# Patient Record
Sex: FEMALE | Race: WHITE | NOT HISPANIC OR LATINO | Employment: OTHER | URBAN - METROPOLITAN AREA
[De-identification: names, ages, dates, MRNs, and addresses within clinical notes are randomized per-mention and may not be internally consistent; named-entity substitution may affect disease eponyms.]

---

## 2019-11-14 ENCOUNTER — OFFICE VISIT (OUTPATIENT)
Dept: FAMILY MEDICINE | Facility: CLINIC | Age: 84
End: 2019-11-14
Payer: MEDICARE

## 2019-11-14 ENCOUNTER — LAB VISIT (OUTPATIENT)
Dept: LAB | Facility: HOSPITAL | Age: 84
End: 2019-11-14
Attending: INTERNAL MEDICINE
Payer: MEDICARE

## 2019-11-14 VITALS
HEART RATE: 66 BPM | DIASTOLIC BLOOD PRESSURE: 73 MMHG | HEIGHT: 64 IN | BODY MASS INDEX: 42.85 KG/M2 | WEIGHT: 251 LBS | SYSTOLIC BLOOD PRESSURE: 131 MMHG | TEMPERATURE: 98 F

## 2019-11-14 DIAGNOSIS — M15.9 GENERALIZED OSTEOARTHRITIS: ICD-10-CM

## 2019-11-14 DIAGNOSIS — Z79.899 ENCOUNTER FOR LONG-TERM CURRENT USE OF MEDICATION: ICD-10-CM

## 2019-11-14 DIAGNOSIS — Z86.711 HX PULMONARY EMBOLISM: ICD-10-CM

## 2019-11-14 DIAGNOSIS — E78.2 MIXED HYPERLIPIDEMIA: ICD-10-CM

## 2019-11-14 DIAGNOSIS — E03.8 OTHER SPECIFIED HYPOTHYROIDISM: ICD-10-CM

## 2019-11-14 DIAGNOSIS — M80.00XD AGE-RELATED OSTEOPOROSIS WITH CURRENT PATHOLOGICAL FRACTURE WITH ROUTINE HEALING: ICD-10-CM

## 2019-11-14 DIAGNOSIS — E55.9 VITAMIN D DEFICIENCY: ICD-10-CM

## 2019-11-14 DIAGNOSIS — I10 HYPERTENSION, UNSPECIFIED TYPE: ICD-10-CM

## 2019-11-14 DIAGNOSIS — Z71.89 ENCOUNTER FOR MEDICATION COUNSELING: ICD-10-CM

## 2019-11-14 DIAGNOSIS — M17.0 PRIMARY OSTEOARTHRITIS OF BOTH KNEES: ICD-10-CM

## 2019-11-14 DIAGNOSIS — J45.20 MILD INTERMITTENT REACTIVE AIRWAY DISEASE WITHOUT COMPLICATION: Chronic | ICD-10-CM

## 2019-11-14 DIAGNOSIS — E66.01 MORBID OBESITY WITH BMI OF 40.0-44.9, ADULT: ICD-10-CM

## 2019-11-14 DIAGNOSIS — R60.0 BILATERAL EDEMA OF LOWER EXTREMITY: ICD-10-CM

## 2019-11-14 DIAGNOSIS — R73.01 IMPAIRED FASTING GLUCOSE: ICD-10-CM

## 2019-11-14 DIAGNOSIS — E78.5 HYPERLIPIDEMIA, UNSPECIFIED HYPERLIPIDEMIA TYPE: ICD-10-CM

## 2019-11-14 DIAGNOSIS — Z79.01 LONG TERM CURRENT USE OF ANTICOAGULANT THERAPY: ICD-10-CM

## 2019-11-14 DIAGNOSIS — R79.89 ABNORMAL CBC: ICD-10-CM

## 2019-11-14 DIAGNOSIS — K21.9 GASTROESOPHAGEAL REFLUX DISEASE, ESOPHAGITIS PRESENCE NOT SPECIFIED: ICD-10-CM

## 2019-11-14 DIAGNOSIS — M81.0 OSTEOPOROSIS, UNSPECIFIED OSTEOPOROSIS TYPE, UNSPECIFIED PATHOLOGICAL FRACTURE PRESENCE: ICD-10-CM

## 2019-11-14 DIAGNOSIS — S82.142D CLOSED FRACTURE OF LEFT TIBIAL PLATEAU WITH ROUTINE HEALING, SUBSEQUENT ENCOUNTER: ICD-10-CM

## 2019-11-14 DIAGNOSIS — I10 ESSENTIAL HYPERTENSION: Primary | ICD-10-CM

## 2019-11-14 DIAGNOSIS — H35.30 MACULAR DEGENERATION, UNSPECIFIED LATERALITY, UNSPECIFIED TYPE: ICD-10-CM

## 2019-11-14 DIAGNOSIS — I48.0 PAROXYSMAL ATRIAL FIBRILLATION: Chronic | ICD-10-CM

## 2019-11-14 PROBLEM — S82.142A CLOSED FRACTURE OF LEFT TIBIAL PLATEAU: Status: ACTIVE | Noted: 2019-06-20

## 2019-11-14 PROBLEM — R29.6 MULTIPLE FALLS: Status: ACTIVE | Noted: 2018-01-31

## 2019-11-14 PROBLEM — R35.0 INCREASED FREQUENCY OF URINATION: Status: ACTIVE | Noted: 2018-10-15

## 2019-11-14 PROBLEM — R15.9 FECAL INCONTINENCE: Status: ACTIVE | Noted: 2017-01-06

## 2019-11-14 PROBLEM — J30.9 CHRONIC ALLERGIC RHINITIS: Status: ACTIVE | Noted: 2017-02-16

## 2019-11-14 PROBLEM — G47.33 OBSTRUCTIVE SLEEP APNEA OF ADULT: Status: ACTIVE | Noted: 2019-04-15

## 2019-11-14 PROBLEM — J45.909 REACTIVE AIRWAY DISEASE: Chronic | Status: ACTIVE | Noted: 2017-02-16

## 2019-11-14 PROBLEM — J45.909 REACTIVE AIRWAY DISEASE: Status: ACTIVE | Noted: 2017-02-16

## 2019-11-14 PROBLEM — J30.9 CHRONIC ALLERGIC RHINITIS: Chronic | Status: ACTIVE | Noted: 2017-02-16

## 2019-11-14 PROBLEM — G47.33 OBSTRUCTIVE SLEEP APNEA OF ADULT: Chronic | Status: ACTIVE | Noted: 2019-04-15

## 2019-11-14 PROBLEM — Z87.898 HISTORY OF WHEEZING: Status: ACTIVE | Noted: 2017-01-16

## 2019-11-14 PROBLEM — R06.09 DYSPNEA ON EXERTION: Status: ACTIVE | Noted: 2017-01-06

## 2019-11-14 LAB
ALBUMIN SERPL BCP-MCNC: 3.8 G/DL (ref 3.5–5.2)
ALP SERPL-CCNC: 64 U/L (ref 55–135)
ALT SERPL W/O P-5'-P-CCNC: 11 U/L (ref 10–44)
ANION GAP SERPL CALC-SCNC: 10 MMOL/L (ref 8–16)
AST SERPL-CCNC: 20 U/L (ref 10–40)
BASOPHILS # BLD AUTO: 0.03 K/UL (ref 0–0.2)
BASOPHILS NFR BLD: 0.5 % (ref 0–1.9)
BILIRUB SERPL-MCNC: 0.9 MG/DL (ref 0.1–1)
BUN SERPL-MCNC: 16 MG/DL (ref 8–23)
CALCIUM SERPL-MCNC: 9.3 MG/DL (ref 8.7–10.5)
CHLORIDE SERPL-SCNC: 102 MMOL/L (ref 95–110)
CO2 SERPL-SCNC: 30 MMOL/L (ref 23–29)
CREAT SERPL-MCNC: 0.8 MG/DL (ref 0.5–1.4)
DIFFERENTIAL METHOD: ABNORMAL
EOSINOPHIL # BLD AUTO: 0.1 K/UL (ref 0–0.5)
EOSINOPHIL NFR BLD: 1.2 % (ref 0–8)
ERYTHROCYTE [DISTWIDTH] IN BLOOD BY AUTOMATED COUNT: 13.7 % (ref 11.5–14.5)
EST. GFR  (AFRICAN AMERICAN): >60 ML/MIN/1.73 M^2
EST. GFR  (NON AFRICAN AMERICAN): >60 ML/MIN/1.73 M^2
FERRITIN SERPL-MCNC: 37 NG/ML (ref 20–300)
GLUCOSE SERPL-MCNC: 95 MG/DL (ref 70–110)
HCT VFR BLD AUTO: 39.1 % (ref 37–48.5)
HGB BLD-MCNC: 12.1 G/DL (ref 12–16)
IMM GRANULOCYTES # BLD AUTO: 0.01 K/UL (ref 0–0.04)
IMM GRANULOCYTES NFR BLD AUTO: 0.2 % (ref 0–0.5)
IRON SERPL-MCNC: 67 UG/DL (ref 30–160)
LYMPHOCYTES # BLD AUTO: 1.7 K/UL (ref 1–4.8)
LYMPHOCYTES NFR BLD: 26.7 % (ref 18–48)
MAGNESIUM SERPL-MCNC: 1.9 MG/DL (ref 1.6–2.6)
MCH RBC QN AUTO: 30 PG (ref 27–31)
MCHC RBC AUTO-ENTMCNC: 30.9 G/DL (ref 32–36)
MCV RBC AUTO: 97 FL (ref 82–98)
MONOCYTES # BLD AUTO: 0.6 K/UL (ref 0.3–1)
MONOCYTES NFR BLD: 8.6 % (ref 4–15)
NEUTROPHILS # BLD AUTO: 4.1 K/UL (ref 1.8–7.7)
NEUTROPHILS NFR BLD: 62.8 % (ref 38–73)
NRBC BLD-RTO: 0 /100 WBC
PLATELET # BLD AUTO: 145 K/UL (ref 150–350)
PMV BLD AUTO: 12.6 FL (ref 9.2–12.9)
POTASSIUM SERPL-SCNC: 4 MMOL/L (ref 3.5–5.1)
PROT SERPL-MCNC: 6.9 G/DL (ref 6–8.4)
RBC # BLD AUTO: 4.03 M/UL (ref 4–5.4)
SATURATED IRON: 16 % (ref 20–50)
SODIUM SERPL-SCNC: 142 MMOL/L (ref 136–145)
TOTAL IRON BINDING CAPACITY: 423 UG/DL (ref 250–450)
TRANSFERRIN SERPL-MCNC: 286 MG/DL (ref 200–375)
WBC # BLD AUTO: 6.48 K/UL (ref 3.9–12.7)

## 2019-11-14 PROCEDURE — 80053 COMPREHEN METABOLIC PANEL: CPT

## 2019-11-14 PROCEDURE — 83540 ASSAY OF IRON: CPT

## 2019-11-14 PROCEDURE — 36415 COLL VENOUS BLD VENIPUNCTURE: CPT | Mod: PO

## 2019-11-14 PROCEDURE — 85025 COMPLETE CBC W/AUTO DIFF WBC: CPT

## 2019-11-14 PROCEDURE — 83735 ASSAY OF MAGNESIUM: CPT

## 2019-11-14 PROCEDURE — 99203 OFFICE O/P NEW LOW 30 MIN: CPT | Mod: PBBFAC,PO | Performed by: INTERNAL MEDICINE

## 2019-11-14 PROCEDURE — 83036 HEMOGLOBIN GLYCOSYLATED A1C: CPT

## 2019-11-14 PROCEDURE — 99214 PR OFFICE/OUTPT VISIT, EST, LEVL IV, 30-39 MIN: ICD-10-PCS | Mod: S$PBB,,, | Performed by: INTERNAL MEDICINE

## 2019-11-14 PROCEDURE — 99214 OFFICE O/P EST MOD 30 MIN: CPT | Mod: S$PBB,,, | Performed by: INTERNAL MEDICINE

## 2019-11-14 PROCEDURE — 82728 ASSAY OF FERRITIN: CPT

## 2019-11-14 PROCEDURE — 99999 PR PBB SHADOW E&M-NEW PATIENT-LVL III: CPT | Mod: PBBFAC,,, | Performed by: INTERNAL MEDICINE

## 2019-11-14 PROCEDURE — 82607 VITAMIN B-12: CPT

## 2019-11-14 PROCEDURE — 82306 VITAMIN D 25 HYDROXY: CPT

## 2019-11-14 PROCEDURE — 99999 PR PBB SHADOW E&M-NEW PATIENT-LVL III: ICD-10-PCS | Mod: PBBFAC,,, | Performed by: INTERNAL MEDICINE

## 2019-11-14 RX ORDER — FLUTICASONE PROPIONATE 50 MCG
1 SPRAY, SUSPENSION (ML) NASAL DAILY
COMMUNITY
End: 2021-01-14

## 2019-11-14 RX ORDER — METOPROLOL SUCCINATE 25 MG/1
25 TABLET, EXTENDED RELEASE ORAL DAILY
COMMUNITY
Start: 2018-04-13 | End: 2020-01-31 | Stop reason: SDUPTHER

## 2019-11-14 RX ORDER — FUROSEMIDE 20 MG/1
20 TABLET ORAL DAILY PRN
COMMUNITY
Start: 2019-09-25 | End: 2020-07-15 | Stop reason: SDUPTHER

## 2019-11-14 RX ORDER — PANTOPRAZOLE SODIUM 40 MG/1
40 TABLET, DELAYED RELEASE ORAL DAILY
COMMUNITY
Start: 2019-06-20 | End: 2020-01-06

## 2019-11-14 RX ORDER — LEVOTHYROXINE SODIUM 175 UG/1
175 TABLET ORAL DAILY
COMMUNITY
Start: 2019-08-06 | End: 2020-02-02 | Stop reason: SDUPTHER

## 2019-11-14 RX ORDER — MONTELUKAST SODIUM 10 MG/1
10 TABLET ORAL DAILY
COMMUNITY
Start: 2019-09-25 | End: 2020-03-16 | Stop reason: SDUPTHER

## 2019-11-14 RX ORDER — CETIRIZINE HYDROCHLORIDE 10 MG/1
10 TABLET ORAL DAILY
COMMUNITY
End: 2020-01-31 | Stop reason: SDUPTHER

## 2019-11-14 RX ORDER — GLUCOSAMINE/CHONDR SU A SOD 167-133 MG
500 CAPSULE ORAL 2 TIMES DAILY
COMMUNITY

## 2019-11-14 RX ORDER — MULTIVITAMIN
1 TABLET ORAL DAILY
COMMUNITY

## 2019-11-14 RX ORDER — ALENDRONATE SODIUM 70 MG/1
70 TABLET ORAL
Qty: 12 TABLET | Refills: 1 | Status: SHIPPED | OUTPATIENT
Start: 2019-11-14 | End: 2020-01-31 | Stop reason: SDUPTHER

## 2019-11-14 RX ORDER — POTASSIUM CHLORIDE 20 MEQ/1
20 TABLET, EXTENDED RELEASE ORAL 2 TIMES DAILY
COMMUNITY
Start: 2019-07-23 | End: 2020-01-31

## 2019-11-14 RX ORDER — DICLOFENAC SODIUM 10 MG/G
2 GEL TOPICAL DAILY
Qty: 100 G | Refills: 0 | Status: SHIPPED | OUTPATIENT
Start: 2019-11-14 | End: 2020-01-01 | Stop reason: SDUPTHER

## 2019-11-14 RX ORDER — ALENDRONATE SODIUM 70 MG/1
70 TABLET ORAL DAILY
COMMUNITY
Start: 2019-06-10 | End: 2019-11-14 | Stop reason: SDUPTHER

## 2019-11-14 NOTE — PROGRESS NOTES
Subjective:      Patient ID: Laura Yu is a 84 y.o. female.    Chief Complaint: Osteoporosis (refill) and Medication Problem (discuss lasix)    HPI     84F here for osteoporosis f/u (refill), discuss Lasix, and knee pain. Last seen by me at [facility] on 6/20/19.     Since our last visit she was seen by Dr. Gabriel on 06/25/2019.  She was referred to a retina specialist.  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  She experienced some strange vision changes for which she thought were hallucinations, but was later advised by the specialist that this was secondary to the macular degeneration.  She has subsequently followed up with Dr. Gabriel for her routine laser therapy.    Left tibial plateau fracture  -had fall at home on 5/28/19. Following with dr. Boucher, non operative management still being continued.    Right knee pain/osteoarthritis  -at her visit with Orthopedics as above on 07/18/2019 she endorsed right knee pain.  X-ray shows osteoarthritis.  The knee was injected and she had good relief for approximately 3 months.  The pain has returned.  She has been using Tylenol with some relief.  We discussed doing a trial of Voltaren gel and resuming home physical therapy.  If no improvement I advised her I could inject the knee.  Ultimately weight loss would help.  She has also since our visit discontinued Cymbalta.  We discussed that this does have properties to help with pain control and this is something we could consider in the future potentially resuming.  Today her PHQ 2 score is 0, therefore will avoid and try topical therapy.    Osteoporosis  -compliant with Fosamax. She is tolerating the medication well with no GI side effects.     She followed up with Dr. Sandy on 09/25/2019 for reactive airway disease. She was advised to continue her medication regimen and prescribed Lasix as needed for weight gain greater than 2 lb, however the  prescription sent to the pharmacy was for daily.  Ms. Sanchez has not been taking this daily as she is already on HCTZ and is worried about the frequent urination.  Her lower extremity edema has been stable.  I advised her that the indication is for as needed.     Repeat laboratory data has been ordered.    Review of patient's allergies indicates:   Allergen Reactions    Hydrocodone-acetaminophen     Iodine and iodide containing products     Meperidine     Morphine     Penicillins     Sulfa (sulfonamide antibiotics)        Current Outpatient Medications:     alendronate (FOSAMAX) 70 MG tablet, Take 1 tablet (70 mg total) by mouth every 7 days., Disp: 12 tablet, Rfl: 1    cetirizine (ZYRTEC) 10 MG tablet, Take 10 mg by mouth once daily., Disp: , Rfl:     fluticasone furoate (ARNUITY ELLIPTA) 100 mcg/actuation DsDv, Inhale 1 puff into the lungs., Disp: , Rfl:     fluticasone propionate (FLONASE) 50 mcg/actuation nasal spray, 1 spray by Nasal route once daily., Disp: , Rfl:     levothyroxine (SYNTHROID, LEVOTHROID) 175 MCG tablet, Take 175 mcg by mouth once daily., Disp: , Rfl:     metoprolol succinate (TOPROL-XL) 25 MG 24 hr tablet, Take 25 mg by mouth once daily., Disp: , Rfl:     montelukast (SINGULAIR) 10 mg tablet, Take 10 mg by mouth once daily., Disp: , Rfl:     multivitamin (THERAGRAN) per tablet, Take 1 tablet by mouth once daily., Disp: , Rfl:     niacin 500 MG Tab, Take 500 mg by mouth 2 (two) times daily., Disp: , Rfl:     pantoprazole (PROTONIX) 40 MG tablet, Take 40 mg by mouth once daily., Disp: , Rfl:     potassium chloride SA (K-DUR,KLOR-CON) 20 MEQ tablet, Take 20 mEq by mouth 2 (two) times daily., Disp: , Rfl:     rivaroxaban (XARELTO) 20 mg Tab, Take 20 mg by mouth once daily., Disp: , Rfl:     diclofenac sodium (VOLTAREN) 1 % Gel, Apply 2 g topically once daily., Disp: 100 g, Rfl: 0    furosemide (LASIX) 20 MG tablet, Take 20 mg by mouth daily as needed. , Disp: , Rfl:     Past  Medical History:   Diagnosis Date    Age-related osteoporosis with current pathological fracture with routine healing 3/20/2019    Chronic allergic rhinitis 2/16/2017    Deep vein thrombosis     Disorder of kidney and ureter     Essential hypertension 11/14/2019    Generalized osteoarthritis 6/5/2013    GERD (gastroesophageal reflux disease) 11/14/2019    Hx pulmonary embolism 4/3/2014    Had saddle embolism---2010 S/p IVC filter    Macular degeneration     Mixed hyperlipidemia 11/14/2019    Mixed stress and urge urinary incontinence 8/12/2014    Obstructive sleep apnea of adult 4/15/2019    Other specified hypothyroidism 6/5/2013    ICD-10 Transition    Paroxysmal atrial fibrillation 11/14/2019    Reactive airway disease 2/16/2017    S/P IVC filter      Past Surgical History:   Procedure Laterality Date    CATARACT EXTRACTION, BILATERAL      CHOLECYSTECTOMY      FRACTURE SURGERY      ORIF left hip wtih IMN    CHIDI FILTER PLACEMENT      STOMACH SURGERY      TONSILLECTOMY       Family History   Problem Relation Age of Onset    Kidney disease Mother     Heart disease Father     Diabetes Sister     Asthma Daughter     Leukemia Daughter     Lymphoma Daughter      Social History     Socioeconomic History    Marital status:      Spouse name: Not on file    Number of children: Not on file    Years of education: Not on file    Highest education level: Not on file   Occupational History    Not on file   Social Needs    Financial resource strain: Not on file    Food insecurity:     Worry: Not on file     Inability: Not on file    Transportation needs:     Medical: Not on file     Non-medical: Not on file   Tobacco Use    Smoking status: Never Smoker    Smokeless tobacco: Never Used   Substance and Sexual Activity    Alcohol use: Not Currently     Frequency: Never    Drug use: Never    Sexual activity: Not Currently     Birth control/protection: Post-menopausal   Lifestyle  "   Physical activity:     Days per week: Not on file     Minutes per session: Not on file    Stress: Only a little   Relationships    Social connections:     Talks on phone: Not on file     Gets together: Not on file     Attends Protestant service: Not on file     Active member of club or organization: Not on file     Attends meetings of clubs or organizations: Not on file     Relationship status: Not on file   Other Topics Concern    Not on file   Social History Narrative    Not on file      Review of Systems      Objective:     Body mass index is 43.08 kg/m².  /73   Pulse 66   Temp 97.6 °F (36.4 °C) (Oral)   Ht 5' 4" (1.626 m)   Wt 113.9 kg (251 lb)   BMI 43.08 kg/m²       Physical Exam   Constitutional: She is oriented to person, place, and time. She appears well-developed and well-nourished. No distress.   Accompanied by family member.  Ambulating via rolling walker.   HENT:   Head: Normocephalic.   Mouth/Throat: Oropharynx is clear and moist.   Eyes: Conjunctivae are normal.   Cardiovascular: Normal rate, regular rhythm, normal heart sounds and intact distal pulses.   No murmur heard.  Pulmonary/Chest: Effort normal and breath sounds normal.   Abdominal: Soft. Bowel sounds are normal.   Musculoskeletal: She exhibits edema (Trace-1+ bilateral lower). She exhibits no tenderness.   Crepitus appreciated in the right knee.  No appreciable joint effusions.  Limited range of motion at the joints secondary to body habitus.   Lymphadenopathy:     She has no cervical adenopathy.   Neurological: She is alert and oriented to person, place, and time. No sensory deficit.   Skin: Skin is warm and dry. Capillary refill takes 2 to 3 seconds. She is not diaphoretic.   Psychiatric: She has a normal mood and affect. Her behavior is normal.   Nursing note and vitals reviewed.      No visits with results within 6 Month(s) from this visit.   Latest known visit with results is:   Historical on 07/09/2008   Component " "Date Value Ref Range Status    TSH 07/09/2008 0.02* 0.4 - 4.0 uIU/ml Final    Prothrombin Time 07/09/2008 15.6* 9.0 - 12.5 sec Final    INR 07/09/2008 1.6* 0.8 - 1.2 Final    Comment: ACCP Guideline for Coumadin usage recommends a "target range" of  2.0 - 3.0 for INR for all indicators except mechanical heart valves  and antiphospholipid syndromes which should use 2.5 - 3.5.  .      Free T4 07/09/2008 1.07  0.71 - 1.51 ng/dl Final    Prothrombin Time 06/03/2008 27.6* 9.0 - 12.5 sec Final    INR 06/03/2008 3.0* 0.8 - 1.2 Final    Comment: ACCP Guideline for Coumadin usage recommends a "target range" of  2.0 - 3.0 for INR for all indicators except mechanical heart valves  and antiphospholipid syndromes which should use 2.5 - 3.5.  .       No results found in the last 24 hours.   Assessment:     Encounter Diagnoses   Name Primary?    Essential hypertension Yes    Bilateral edema of lower extremity     Mixed hyperlipidemia     Hx pulmonary embolism     Long term current use of anticoagulant therapy     Morbid obesity with BMI of 40.0-44.9, adult     Other specified hypothyroidism     Primary osteoarthritis of both knees     Encounter for long-term current use of medication     Impaired fasting glucose     Abnormal CBC     Age-related osteoporosis with current pathological fracture with routine healing     Closed fracture of left tibial plateau with routine healing, subsequent encounter     Gastroesophageal reflux disease, esophagitis presence not specified     Paroxysmal atrial fibrillation     Mild intermittent reactive airway disease without complication     Vitamin D deficiency     Macular degeneration, unspecified laterality, unspecified type     Encounter for medication counseling         Plan:     #Right knee pain/OA  -Saw Dr. Bouchre on 7/18/19 with injection given (good relief). Has been using tylenol.   -advised trial of voltaren gel, resuming PT with home health, and if no improvement " will inject knee.   -counseled on weight loss  -advised cymbalta could help with pain. May consider resuming in future.     #Macular degeneration  -referred to retina specialist by Dr. Magaña on 6/25/19. Continue to follow.     #Medication counseling  -Given lasix by dr. armijo for PRN BLE edema, but bottle reported daily. She has not been taking daily. Advised to only take if weight >3+ pounds. Trace-1+ today and she is on HCTZ    #Left nondisplaced tibial plateau fracture, healing   -following with dr. Boucher..seen on 07/18/2019  -no surgical intervention.  -on xarelto chronically    #MDD, in remission  Controlled. phq2 score 0 today, 11/14  -Previously on cymbalta 30 mg daily. Has d/c.     #Allergies, controlled   -Continue Singulair 10 mg daily + Cetirizine 10 mg daily + Flonase    #ALBERTO, controlled  -Following with Dr. Reynoso   -Sleep study 3/26/19: mild ALBERTO with o2 sat down to 80%.  -has cpap machine now, but will need to f/up for titration    #Osteoporosis  -9/7/17 DEXA  -continue fosamax- refilled     #GERD, controlled  -Protonix 40 mg daily     #S/p left ureteral stent placement with urinary incontinence  -Follows with Dr. Rosas     #History of saddle embolus requiring TPA in 2010  -continue chronic xarelto  -TTE: 4/10/19-normal EF (50-55%), dense thickening and calcification of the MV annulus, and RVSP of 26 mmHg. No evidence of pulmonary hypertension from this. Patient to see her cardiologist, Dr. Tono Talbot on 1/21/20.    #HTN  -controlled  -HCTZ 25 mg daily + K 20 meq daily   -3/29/19: gfr >60, microalbumin/cr 18, cr 0.84    #Paroxysmal afib, controlled.   -Toprol XL 25 mg daily + Xarelto 20 mg daily     #Leg pain   -Continue Gabapentin 300 mg TID     #HLD   -Continue Niacin   -Can't take statins due to myalgias   -3/29/19: lipid panel: chol 161, tri 71, hdl 52, ldl 94. Repeat.     #Hypothyroidism   -12/11/18: TSH 0.92. Repeat.  -Continue Synthroid 175 mcg daily     #Reactive airway disease, controlled    -PFT 1/17  -arnuity 100 daily  -following with dr. Sandy. Last seen 9/25/19    #Morbid obesity- BMI 43.08  -3/29/19: ha1c 5.6%    #Vitamin D deficiency  -3/29/19: vit d 16.7, p 3.9, mg 1.9  -started on 1K daily    #healthcare maintenance  -pneumonia vaccines up-to-date  -flu 9/25/19  -mammogram 10/15/18: birads 2. No further testing.  -3/29/19: b12 211  -shingrix rx given    Follow up in 3 months. Follows with [facility]. Repeat labs.     Mary Ann Wylei      Orders Placed This Encounter   Procedures    CBC auto differential     Standing Status:   Future     Number of Occurrences:   1     Standing Expiration Date:   11/13/2020    Comprehensive metabolic panel     Standing Status:   Future     Number of Occurrences:   1     Standing Expiration Date:   11/13/2020    Magnesium     Standing Status:   Future     Number of Occurrences:   1     Standing Expiration Date:   1/12/2021    Hemoglobin A1c     Standing Status:   Future     Number of Occurrences:   1     Standing Expiration Date:   1/12/2021    Vitamin D     Standing Status:   Future     Number of Occurrences:   1     Standing Expiration Date:   11/13/2020    Vitamin B12     Standing Status:   Future     Number of Occurrences:   1     Standing Expiration Date:   1/12/2021    Ferritin     Standing Status:   Future     Number of Occurrences:   1     Standing Expiration Date:   1/12/2021    Iron and TIBC     Standing Status:   Future     Number of Occurrences:   1     Standing Expiration Date:   1/12/2021

## 2019-11-15 ENCOUNTER — TELEPHONE (OUTPATIENT)
Dept: HOME HEALTH SERVICES | Facility: HOSPITAL | Age: 84
End: 2019-11-15

## 2019-11-15 PROBLEM — E55.9 VITAMIN D DEFICIENCY: Status: RESOLVED | Noted: 2019-11-14 | Resolved: 2019-11-15

## 2019-11-15 LAB
25(OH)D3+25(OH)D2 SERPL-MCNC: 43 NG/ML (ref 30–96)
ESTIMATED AVG GLUCOSE: 103 MG/DL (ref 68–131)
HBA1C MFR BLD HPLC: 5.2 % (ref 4–5.6)
VIT B12 SERPL-MCNC: >2000 PG/ML (ref 210–950)

## 2019-11-16 PROCEDURE — G0179 MD RECERTIFICATION HHA PT: HCPCS | Mod: ,,, | Performed by: INTERNAL MEDICINE

## 2019-11-16 PROCEDURE — G0179 PR HOME HEALTH MD RECERTIFICATION: ICD-10-PCS | Mod: ,,, | Performed by: INTERNAL MEDICINE

## 2019-11-26 ENCOUNTER — TELEPHONE (OUTPATIENT)
Dept: HOME HEALTH SERVICES | Facility: HOSPITAL | Age: 84
End: 2019-11-26

## 2019-12-03 ENCOUNTER — EXTERNAL HOME HEALTH (OUTPATIENT)
Dept: HOME HEALTH SERVICES | Facility: HOSPITAL | Age: 84
End: 2019-12-03
Payer: MEDICARE

## 2019-12-17 ENCOUNTER — TELEPHONE (OUTPATIENT)
Dept: FAMILY MEDICINE | Facility: CLINIC | Age: 84
End: 2019-12-17

## 2019-12-17 DIAGNOSIS — R35.0 FREQUENT URINATION: Primary | ICD-10-CM

## 2019-12-17 DIAGNOSIS — R82.90 FOUL SMELLING URINE: ICD-10-CM

## 2019-12-17 NOTE — TELEPHONE ENCOUNTER
----- Message from Soraida Ely sent at 12/17/2019  9:36 AM CST -----  Contact[facility] is requesting call back in regards to getting lab order for UTI          Pls call back at [phone]

## 2019-12-17 NOTE — TELEPHONE ENCOUNTER
----- Message from Soraida Ely sent at 12/17/2019  9:36 AM CST -----  Contact: [facility] is requesting call back in regards to getting lab order for UTI          Pls call back at [phone]

## 2019-12-19 ENCOUNTER — TELEPHONE (OUTPATIENT)
Dept: FAMILY MEDICINE | Facility: CLINIC | Age: 84
End: 2019-12-19

## 2019-12-19 NOTE — TELEPHONE ENCOUNTER
MICROBIOLOGY  Component Name  12/17/2019 12/17/2019 12/17/2019 12/17/2019 3/29/2019 3/29/2019 3/29/2019 3/29/2019 10/3/2018 10/3/2018 1/28/2018 1/6/2017 1/6/2017     >100,000 CF... . This is con... >100,000 CFU/mL >100,000 CF... . This is con... >100,000 CFU/mL >100,000 CFU/mL >100,000 CFU/mL   No growth 1... No growth 1...         Escherichia coli (A)       Escherichia coli (A)   Escherichia coli (A)                             NEGATIVE       Micro Culture Result   Urine Culture Result   MRSA by PCR      URINALYSIS  Component Name  12/17/2019 3/29/2019 10/3/2018 7/11/2017     CCMS CCMS CATH     YELLOW YELLOW DK YELLOW     CLOUDY CLOUDY CLOUDY CLEAR   NEGATIVE NEGATIVE NEGATIVE NEGATIVE   NEGATIVE NEGATIVE NEGATIVE NEGATIVE   NEGATIVE NEGATIVE NEGATIVE     1.023 1.023 1.023 1.022   NEGATIVE NEGATIVE LARGE (A)     6.0 5.0 5.0 < OR = 5.0   NEGATIVE NEGATIVE 30 (A) NEGATIVE   1.0 1.0 0.2     POSITIVE (A) POSITIVE (A) POSITIVE (A)     MODERATE (A) MODERATE (A) MODERATE (A) NEGATIVE   6 (H) 2 162 (H) 0 - 2   109 (H) 63 (H) 67 (H) NONE SEEN   20 (H) 3 (H) 0       13 (H) 1 NONE SEEN   >50 (A) >50 (A) >50 (A)           YELLOW         NEGATIVE         NONE SEEN         CANCELED   1 GRANULAR (A)     CANCELED         CANCELED   Urine type   Color, Urine   Appearance   Glucose, Urine   Bilirubin, Urine   Ketones, Urine   Specific Gravity, Urine   Blood, Urine   pH, Urine   Protein, Urine   Urobilinogen   Nitrite, Urine   Leuk. Esterase, Urine   RBC, Urine   WBC, Urine   Epith. Cells   Casts, Hyaline   Bacteria, Urine   Color, Urine:   Ketones,Urine   Bacteria   Crystals, Urine   Casts, Urine   Yeast, urine     OTHER URINE TESTS  Component Name  12/17/2019 3/29/2019 3/29/2019 10/3/2018 8/7/2018 7/11/2017     6.0 5.0   5.0   < OR = 5.0       see below   see below         116   70     pH, Urine   Micoralbumin, Urine   Creatinine, Urine

## 2019-12-19 NOTE — TELEPHONE ENCOUNTER
Her urine is growing e coli that is resistant to all oral antibiotics except sulfa medications, which she is allergic to unfortunately. I believe this resistant is due to repeated antibiotic courses over the years.     Nonetheless she needs to see Urology for evaluation and further recommendations. Please place consult.

## 2019-12-20 ENCOUNTER — TELEPHONE (OUTPATIENT)
Dept: FAMILY MEDICINE | Facility: CLINIC | Age: 84
End: 2019-12-20

## 2019-12-20 NOTE — TELEPHONE ENCOUNTER
----- Message from Belinda Galo sent at 12/20/2019 11:07 AM CST -----  Contact: pt daughter -Ms Perdue  Stated she calling for results from a test taken by pt home health nurse, she can be reached at [phone] Thanks

## 2019-12-20 NOTE — TELEPHONE ENCOUNTER
Spoke to daughter who was having problems getting in touch with Dr. Nicholas office. I called and spoke to nurse Lillie and she is going to consult with Dr. Rosas and give us a call back.

## 2019-12-20 NOTE — TELEPHONE ENCOUNTER
Lillie from Dr. Nicholas office called back sating that Dr. Rosas reviewed the culture and states that he would suggest Macrobid.

## 2019-12-23 ENCOUNTER — TELEPHONE (OUTPATIENT)
Dept: FAMILY MEDICINE | Facility: CLINIC | Age: 84
End: 2019-12-23

## 2019-12-23 NOTE — TELEPHONE ENCOUNTER
----- Message from Chauncey Andrade sent at 12/23/2019  9:04 AM CST -----  Contact: Pt's daughter-Annita  Type:  Patient Returning Call    Who Called:Pt's daughter-annita  Who Left Message for Patient: unknown   Does the patient know what this is regarding?:yes   Would the patient rather a call back or a response via [facility]? Call back   Best Call Back Number: [phone]  Additional Information: n/a

## 2019-12-23 NOTE — TELEPHONE ENCOUNTER
Spoke to patients daughter, they will be unable to go see Dr. Rosas tomorrow with Urology. Patients daughter annita will contact Dr. Nicholas office to discuss this problem with them.

## 2019-12-23 NOTE — TELEPHONE ENCOUNTER
----- Message from Juliette Ball sent at 12/23/2019  9:41 AM CST -----  Contact: Ca  .Type:  Patient Returning Call    Who Called:Ca  Who Left Message for Patient:nurse  Does the patient know what this is regarding?:she requested the call  Would the patient rather a call back or a response via [facility]? Call back  Best Call Back Number:[phone]  Additional Information:

## 2019-12-23 NOTE — TELEPHONE ENCOUNTER
Called pt daughter back to let her know:    Left message to inform patient of appointment with Dr. Rosas on Tuesday 12/24/19 @9am

## 2020-01-01 DIAGNOSIS — M17.0 PRIMARY OSTEOARTHRITIS OF BOTH KNEES: ICD-10-CM

## 2020-01-01 RX ORDER — DICLOFENAC SODIUM 10 MG/G
2 GEL TOPICAL DAILY
Qty: 100 G | Refills: 0 | Status: SHIPPED | OUTPATIENT
Start: 2020-01-01 | End: 2020-04-29 | Stop reason: SDUPTHER

## 2020-01-06 DIAGNOSIS — K21.9 GASTROESOPHAGEAL REFLUX DISEASE, ESOPHAGITIS PRESENCE NOT SPECIFIED: Primary | Chronic | ICD-10-CM

## 2020-01-06 RX ORDER — PANTOPRAZOLE SODIUM 40 MG/1
40 TABLET, DELAYED RELEASE ORAL DAILY
Qty: 90 TABLET | Refills: 1 | Status: CANCELLED | OUTPATIENT
Start: 2020-01-06 | End: 2020-07-04

## 2020-01-06 RX ORDER — HYDROCHLOROTHIAZIDE 12.5 MG/1
25 TABLET ORAL DAILY
Qty: 180 TABLET | Refills: 1 | Status: CANCELLED | OUTPATIENT
Start: 2020-01-06 | End: 2020-07-04

## 2020-01-06 NOTE — TELEPHONE ENCOUNTER
----- Message from Leesa Stinson sent at 1/6/2020  2:43 PM CST -----  Contact: [phone]  Would like to consult with the nurse,  Patient would like to get a refill on her Rx Medication, Please call back at  [phone], thanks sj  .Type:  RX Refill Request    Who Called:  Ms Yu  Refill or New Rx:refill  RX Name and Strength: Hytz Also Proteinic ,  How is the patient currently taking it? (ex. 1XDay):Once a day  Is this a 30 day or 90 day RX:90  Preferred Pharmacy with phone number:.      [facility] - [address]  [address]  [facility] [address]  Phone: [phone] Fax: [phone]      Local or Mail OrderMail  Ordering Provider:Dr Wylie  Would the patient rather a call back or a response via [facility]? CallBack  Best Call Back Number:[phone]  Additional Information:

## 2020-01-07 RX ORDER — HYDROCHLOROTHIAZIDE 12.5 MG/1
25 TABLET ORAL DAILY
Qty: 60 TABLET | Refills: 0 | Status: SHIPPED | OUTPATIENT
Start: 2020-01-07 | End: 2020-01-31

## 2020-01-07 RX ORDER — PANTOPRAZOLE SODIUM 40 MG/1
40 TABLET, DELAYED RELEASE ORAL DAILY
Qty: 30 TABLET | Refills: 0 | Status: SHIPPED | OUTPATIENT
Start: 2020-01-07 | End: 2020-03-16 | Stop reason: SDUPTHER

## 2020-01-07 NOTE — TELEPHONE ENCOUNTER
----- Message from Azalea Jamison sent at 1/7/2020  9:15 AM CST -----  Contact: Pt   Type:  RX Refill Request    Who Called: Laura Yu  Refill or New Rx:Refill  RX Name and Strength:hydochlorothiazide 25 mg  How is the patient currently taking it? (ex. 1XDay):1x  Is this a 30 day or 90 day RX:30 day  Preferred Pharmacy with phone number:  [facility]/pharmacy #[id number] - [place], [place] - [address] [place]  [address] [place] [address]  Phone: [phone] Fax: [phone]  Local or Mail Order:Local  Ordering Provider: Dr. Wylie  Would the patient rather a call back or a response via [facility]? Call Back  Best Call Back Number:[phone] (home)   Additional Information: Pt is asking for a weeks worth to be sent to [facility] and the 90 day supply to be sent to       [facility] - [address]  [address]  [facility] [address]  Phone: [phone] Fax: [phone]

## 2020-01-07 NOTE — TELEPHONE ENCOUNTER
Patient states she is completely out of medication, should she still wait until friday for refills?

## 2020-01-10 ENCOUNTER — HOSPITAL ENCOUNTER (OUTPATIENT)
Dept: RADIOLOGY | Facility: HOSPITAL | Age: 85
Discharge: HOME | End: 2020-01-10
Attending: FAMILY MEDICINE
Payer: MEDICARE

## 2020-01-10 ENCOUNTER — OFFICE VISIT (OUTPATIENT)
Dept: FAMILY MEDICINE | Facility: CLINIC | Age: 85
End: 2020-01-10
Payer: MEDICARE

## 2020-01-10 VITALS
TEMPERATURE: 99 F | HEIGHT: 64 IN | WEIGHT: 248 LBS | BODY MASS INDEX: 42.34 KG/M2 | RESPIRATION RATE: 16 BRPM | HEART RATE: 71 BPM | SYSTOLIC BLOOD PRESSURE: 121 MMHG | DIASTOLIC BLOOD PRESSURE: 71 MMHG

## 2020-01-10 VITALS — HEIGHT: 64 IN | WEIGHT: 248 LBS | BODY MASS INDEX: 42.34 KG/M2

## 2020-01-10 DIAGNOSIS — Z12.31 ENCOUNTER FOR SCREENING MAMMOGRAM FOR BREAST CANCER: ICD-10-CM

## 2020-01-10 DIAGNOSIS — N30.00 ACUTE CYSTITIS WITHOUT HEMATURIA: Primary | ICD-10-CM

## 2020-01-10 PROCEDURE — 1126F AMNT PAIN NOTED NONE PRSNT: CPT | Mod: ,,, | Performed by: FAMILY MEDICINE

## 2020-01-10 PROCEDURE — 77063 MAMMO DIGITAL SCREENING BILAT WITH TOMOSYNTHESIS_CAD: ICD-10-PCS | Mod: 26,,, | Performed by: RADIOLOGY

## 2020-01-10 PROCEDURE — 77067 MAMMO DIGITAL SCREENING BILAT WITH TOMOSYNTHESIS_CAD: ICD-10-PCS | Mod: 26,,, | Performed by: RADIOLOGY

## 2020-01-10 PROCEDURE — 99999 PR PBB SHADOW E&M-EST. PATIENT-LVL III: ICD-10-PCS | Mod: PBBFAC,,, | Performed by: FAMILY MEDICINE

## 2020-01-10 PROCEDURE — 99213 OFFICE O/P EST LOW 20 MIN: CPT | Mod: PBBFAC,PO | Performed by: FAMILY MEDICINE

## 2020-01-10 PROCEDURE — 1126F PR PAIN SEVERITY QUANTIFIED, NO PAIN PRESENT: ICD-10-PCS | Mod: ,,, | Performed by: FAMILY MEDICINE

## 2020-01-10 PROCEDURE — 99999 PR PBB SHADOW E&M-EST. PATIENT-LVL III: CPT | Mod: PBBFAC,,, | Performed by: FAMILY MEDICINE

## 2020-01-10 PROCEDURE — 77063 BREAST TOMOSYNTHESIS BI: CPT | Mod: 26,,, | Performed by: RADIOLOGY

## 2020-01-10 PROCEDURE — 1159F PR MEDICATION LIST DOCUMENTED IN MEDICAL RECORD: ICD-10-PCS | Mod: ,,, | Performed by: FAMILY MEDICINE

## 2020-01-10 PROCEDURE — 99214 OFFICE O/P EST MOD 30 MIN: CPT | Mod: S$PBB,,, | Performed by: FAMILY MEDICINE

## 2020-01-10 PROCEDURE — 1159F MED LIST DOCD IN RCRD: CPT | Mod: ,,, | Performed by: FAMILY MEDICINE

## 2020-01-10 PROCEDURE — 99214 PR OFFICE/OUTPT VISIT, EST, LEVL IV, 30-39 MIN: ICD-10-PCS | Mod: S$PBB,,, | Performed by: FAMILY MEDICINE

## 2020-01-10 PROCEDURE — 77067 SCR MAMMO BI INCL CAD: CPT | Mod: 26,,, | Performed by: RADIOLOGY

## 2020-01-10 PROCEDURE — 77067 SCR MAMMO BI INCL CAD: CPT | Mod: TC,PO

## 2020-01-10 RX ORDER — UBIDECARENONE 75 MG
500 CAPSULE ORAL DAILY
COMMUNITY
End: 2021-01-14

## 2020-01-10 RX ORDER — CHOLECALCIFEROL (VITAMIN D3) 25 MCG
5000 TABLET ORAL DAILY
COMMUNITY

## 2020-01-10 RX ORDER — NITROFURANTOIN 25; 75 MG/1; MG/1
100 CAPSULE ORAL 2 TIMES DAILY
Qty: 20 CAPSULE | Refills: 0 | Status: SHIPPED | OUTPATIENT
Start: 2020-01-10 | End: 2020-01-20

## 2020-01-10 NOTE — PROGRESS NOTES
Subjective:      Patient ID: Laura Yu is a 84 y.o. female.    Chief Complaint: Medication Problem (patient wanted to discuss why she wasnt given any medication for UTI and advised to see ID )    Payton had a UTI and she had to be treated by Dr. Wylie. She also has beeen seen by Dr. Rosas in the past.  She was seen by Dr. Wylie and she reportedly spoke with Dr. Rosas, she states.  I have reveiwed all phone messages at the end of Dec and see where an ID and urolog referral were made. She has not been able to go to these.  She did not take macrobid as suggested by Dr. Rosas.   She has had 2 infections in the last year.  She was hospitalized I reviewed her notes from 2018 on this.      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 S/p IVC filter    Macular degeneration     Mixed hyperlipidemia 11/14/2019    Mixed stress and urge urinary incontinence 8/12/2014    Obstructive sleep apnea of adult 4/15/2019    Other specified hypothyroidism 6/5/2013    ICD-10 Transition    Paroxysmal atrial fibrillation 11/14/2019    Reactive airway disease 2/16/2017    S/P IVC filter      Past Surgical History:   Procedure Laterality Date    CATARACT EXTRACTION, BILATERAL      CHOLECYSTECTOMY      FRACTURE SURGERY      ORIF left hip wtih IMN    CHIDI FILTER PLACEMENT      STOMACH SURGERY      TONSILLECTOMY       Review of patient's allergies indicates:   Allergen Reactions    Hydrocodone-acetaminophen     Iodine and iodide  containing products     Meperidine     Morphine     Penicillins     Sulfa (sulfonamide antibiotics)      Current Outpatient Medications on File Prior to Visit   Medication Sig Dispense Refill    alendronate (FOSAMAX) 70 MG tablet Take 1 tablet (70 mg total) by mouth every 7 days. 12 tablet 1    cetirizine (ZYRTEC) 10 MG tablet Take 10 mg by mouth once daily.      cyanocobalamin (VITAMIN B-12) 500 MCG tablet Take 500 mcg by mouth once daily.      diclofenac sodium (VOLTAREN) 1 % Gel Apply 2 g topically once daily. Apply to knees 100 g 0    fluticasone furoate (ARNUITY ELLIPTA) 100 mcg/actuation DsDv Inhale 1 puff into the lungs.      fluticasone propionate (FLONASE) 50 mcg/actuation nasal spray 1 spray by Nasal route once daily.      hydroCHLOROthiazide (HYDRODIURIL) 12.5 MG Tab Take 2 tablets (25 mg total) by mouth once daily. 60 tablet 0    levothyroxine (SYNTHROID, LEVOTHROID) 175 MCG tablet Take 175 mcg by mouth once daily.      metoprolol succinate (TOPROL-XL) 25 MG 24 hr tablet Take 25 mg by mouth once daily.      montelukast (SINGULAIR) 10 mg tablet Take 10 mg by mouth once daily.      multivitamin (THERAGRAN) per tablet Take 1 tablet by mouth once daily.      niacin 500 MG Tab Take 500 mg by mouth 2 (two) times daily.      pantoprazole (PROTONIX) 40 MG tablet Take 1 tablet (40 mg total) by mouth once daily. 30 tablet 0    potassium chloride SA (K-DUR,KLOR-CON) 20 MEQ tablet Take 20 mEq by mouth 2 (two) times daily.      rivaroxaban (XARELTO) 20 mg Tab Take 20 mg by mouth once daily.      vit C,G-Yb-tgjyw-lutein-zeaxan (PRESERVISION AREDS 2) 290-459-24-1 mg-unit-mg-mg Cap Take by mouth.      vitamin D (VITAMIN D3) 1000 units Tab Take 2,000 Units by mouth once daily.      furosemide (LASIX) 20 MG tablet Take 20 mg by mouth daily as needed.        No current facility-administered medications on file prior to visit.      Social History     Socioeconomic History    Marital status:      " Spouse name: Not on file    Number of children: Not on file    Years of education: Not on file    Highest education level: Not on file   Occupational History    Not on file   Social Needs    Financial resource strain: Not on file    Food insecurity:     Worry: Not on file     Inability: Not on file    Transportation needs:     Medical: Not on file     Non-medical: Not on file   Tobacco Use    Smoking status: Never Smoker    Smokeless tobacco: Never Used   Substance and Sexual Activity    Alcohol use: Not Currently     Frequency: Never    Drug use: Never    Sexual activity: Not Currently     Birth control/protection: Post-menopausal   Lifestyle    Physical activity:     Days per week: Not on file     Minutes per session: Not on file    Stress: Only a little   Relationships    Social connections:     Talks on phone: Not on file     Gets together: Not on file     Attends Scientologist service: Not on file     Active member of club or organization: Not on file     Attends meetings of clubs or organizations: Not on file     Relationship status: Not on file   Other Topics Concern    Not on file   Social History Narrative    Not on file     Family History   Problem Relation Age of Onset    Kidney disease Mother     Heart disease Father     Diabetes Sister     Asthma Daughter     Leukemia Daughter     Lymphoma Daughter              Review of Systems   Constitutional: Negative for chills and fever.   Respiratory: Negative for cough and shortness of breath.    Cardiovascular: Negative for chest pain and palpitations.   Gastrointestinal: Negative for abdominal pain.   Genitourinary: Negative for dysuria and hematuria.       Objective:   /71 (BP Location: Right arm, Patient Position: Sitting, BP Method: Medium (Automatic))   Pulse 71   Temp 98.5 °F (36.9 °C)   Resp 16   Ht 5' 4" (1.626 m)   Wt 112.5 kg (248 lb)   BMI 42.57 kg/m²     Physical Exam   Constitutional: She appears well-developed and " well-nourished. No distress.   Cardiovascular: Normal rate, regular rhythm and intact distal pulses. Exam reveals no gallop and no friction rub.   No murmur heard.  Pulmonary/Chest: Effort normal and breath sounds normal. No respiratory distress. She has no wheezes. She has no rales.   Abdominal: She exhibits no mass. There is no tenderness.   Musculoskeletal: She exhibits no edema.   Skin: She is not diaphoretic.       Assessment:     1. Acute cystitis without hematuria        Plan:   Laura was seen today for medication problem.    Diagnoses and all orders for this visit:    Acute cystitis without hematuria  -     nitrofurantoin, macrocrystal-monohydrate, (MACROBID) 100 MG capsule; Take 1 capsule (100 mg total) by mouth 2 (two) times daily. for 10 days      She is to see Dr. Rosas.  I believe that she can hold on seeing the ID doc at this point. She may be colonized with e coli and this may not be able to be eradicated.  disucssions were had about symptoms, etc to look for in the future.

## 2020-01-15 PROCEDURE — G0179 PR HOME HEALTH MD RECERTIFICATION: ICD-10-PCS | Mod: ,,, | Performed by: INTERNAL MEDICINE

## 2020-01-15 PROCEDURE — G0179 MD RECERTIFICATION HHA PT: HCPCS | Mod: ,,, | Performed by: INTERNAL MEDICINE

## 2020-01-17 ENCOUNTER — TELEPHONE (OUTPATIENT)
Dept: FAMILY MEDICINE | Facility: CLINIC | Age: 85
End: 2020-01-17

## 2020-01-17 NOTE — TELEPHONE ENCOUNTER
Spoke with pt and she states that ever since she started taking the antibiotic that she has been nauseated and has had a headache along with trembling. Pt states she tries to make herself eat with the antibiotic but shes so nauseated that its hard for her to eat. Pt states shes feeling horrible ever since she has started this. Please Advise.

## 2020-01-17 NOTE — TELEPHONE ENCOUNTER
----- Message from Komal Preciado sent at 1/17/2020  8:00 AM CST -----  Contact: pt  The pt wants to know if she can stop takign her antibiotic medication, the pt wants to be advised and can be reached at [phone]///[id number]

## 2020-01-17 NOTE — TELEPHONE ENCOUNTER
Martina (home health nurse) called in regards to the patient having side affects of nausea , headaches due to Macrobid

## 2020-01-17 NOTE — TELEPHONE ENCOUNTER
I don't recommend that she stop the antibiotic.  If she is having problems, she should speak with her PCP.

## 2020-01-17 NOTE — TELEPHONE ENCOUNTER
----- Message from Lilly Jordan sent at 1/17/2020 12:37 PM CST -----  Contact: [facility] called in regards to the patient having side affects of nausea , headaches due to the medication Macrobid. Please call back at [phone].      Thanks,  Lilly Jordan

## 2020-01-23 ENCOUNTER — TELEPHONE (OUTPATIENT)
Dept: FAMILY MEDICINE | Facility: CLINIC | Age: 85
End: 2020-01-23

## 2020-01-23 NOTE — TELEPHONE ENCOUNTER
Spoke to home health nurse advised her that Dr. Wylie is out of the office, but this has been an ongoing problem patient has been experiencing and last recommendation from Dr. Wylie was that patient see Urology (Dr. Jose Rosas). Home health nurse will reach out to Dr. Nicholas office and see if they can help, if not will call back and see in which way we can help.

## 2020-01-23 NOTE — TELEPHONE ENCOUNTER
----- Message from Brittany Mcclendon sent at 1/23/2020  3:00 PM CST -----  Type:  Needs Medical Advice    Who Called:  Sulma Patel with [facility]  Symptoms (please be specific):   uti   How long has patient had these symptoms:   Several days  Pharmacy name and phone #:    [facility] in [place] on [address]  Would the patient rather a call back or a response via [facility]?   Call back  Best Call Back Number:   [phone]  Additional Information:  Pt is needing a medication that she can take//also having flank pain ///please call//kacie/queenie

## 2020-01-29 ENCOUNTER — EXTERNAL HOME HEALTH (OUTPATIENT)
Dept: HOME HEALTH SERVICES | Facility: HOSPITAL | Age: 85
End: 2020-01-29
Payer: MEDICARE

## 2020-01-31 ENCOUNTER — OFFICE VISIT (OUTPATIENT)
Dept: FAMILY MEDICINE | Facility: CLINIC | Age: 85
End: 2020-01-31
Payer: MEDICARE

## 2020-01-31 ENCOUNTER — LAB VISIT (OUTPATIENT)
Dept: LAB | Facility: HOSPITAL | Age: 85
End: 2020-01-31
Attending: INTERNAL MEDICINE
Payer: MEDICARE

## 2020-01-31 VITALS
SYSTOLIC BLOOD PRESSURE: 140 MMHG | RESPIRATION RATE: 16 BRPM | TEMPERATURE: 98 F | OXYGEN SATURATION: 96 % | WEIGHT: 251 LBS | DIASTOLIC BLOOD PRESSURE: 77 MMHG | HEART RATE: 72 BPM | BODY MASS INDEX: 42.85 KG/M2 | HEIGHT: 64 IN

## 2020-01-31 DIAGNOSIS — R60.0 BILATERAL EDEMA OF LOWER EXTREMITY: ICD-10-CM

## 2020-01-31 DIAGNOSIS — I48.0 PAROXYSMAL ATRIAL FIBRILLATION: Chronic | ICD-10-CM

## 2020-01-31 DIAGNOSIS — I10 ESSENTIAL HYPERTENSION: Chronic | ICD-10-CM

## 2020-01-31 DIAGNOSIS — N39.46 MIXED STRESS AND URGE URINARY INCONTINENCE: Chronic | ICD-10-CM

## 2020-01-31 DIAGNOSIS — J45.20 MILD INTERMITTENT REACTIVE AIRWAY DISEASE WITHOUT COMPLICATION: Chronic | ICD-10-CM

## 2020-01-31 DIAGNOSIS — Z13.89 SCREENING FOR HEMATURIA OR PROTEINURIA: ICD-10-CM

## 2020-01-31 DIAGNOSIS — E78.2 MIXED HYPERLIPIDEMIA: Chronic | ICD-10-CM

## 2020-01-31 DIAGNOSIS — M80.00XD AGE-RELATED OSTEOPOROSIS WITH CURRENT PATHOLOGICAL FRACTURE WITH ROUTINE HEALING: ICD-10-CM

## 2020-01-31 DIAGNOSIS — E03.8 OTHER SPECIFIED HYPOTHYROIDISM: Chronic | ICD-10-CM

## 2020-01-31 DIAGNOSIS — E66.01 MORBID OBESITY WITH BMI OF 40.0-44.9, ADULT: Chronic | ICD-10-CM

## 2020-01-31 DIAGNOSIS — R22.2 SUBCUTANEOUS NODULE OF BACK: Primary | ICD-10-CM

## 2020-01-31 LAB
ALBUMIN SERPL BCP-MCNC: 3.8 G/DL (ref 3.5–5.2)
ALP SERPL-CCNC: 75 U/L (ref 55–135)
ALT SERPL W/O P-5'-P-CCNC: 11 U/L (ref 10–44)
ANION GAP SERPL CALC-SCNC: 10 MMOL/L (ref 8–16)
AST SERPL-CCNC: 19 U/L (ref 10–40)
BILIRUB SERPL-MCNC: 0.6 MG/DL (ref 0.1–1)
BUN SERPL-MCNC: 19 MG/DL (ref 8–23)
CALCIUM SERPL-MCNC: 9.3 MG/DL (ref 8.7–10.5)
CHLORIDE SERPL-SCNC: 103 MMOL/L (ref 95–110)
CHOLEST SERPL-MCNC: 178 MG/DL (ref 120–199)
CHOLEST/HDLC SERPL: 3.1 {RATIO} (ref 2–5)
CO2 SERPL-SCNC: 29 MMOL/L (ref 23–29)
CREAT SERPL-MCNC: 0.8 MG/DL (ref 0.5–1.4)
EST. GFR  (AFRICAN AMERICAN): >60 ML/MIN/1.73 M^2
EST. GFR  (NON AFRICAN AMERICAN): >60 ML/MIN/1.73 M^2
GLUCOSE SERPL-MCNC: 91 MG/DL (ref 70–110)
HDLC SERPL-MCNC: 57 MG/DL (ref 40–75)
HDLC SERPL: 32 % (ref 20–50)
LDLC SERPL CALC-MCNC: 102.4 MG/DL (ref 63–159)
NONHDLC SERPL-MCNC: 121 MG/DL
POTASSIUM SERPL-SCNC: 4 MMOL/L (ref 3.5–5.1)
PROT SERPL-MCNC: 7 G/DL (ref 6–8.4)
SODIUM SERPL-SCNC: 142 MMOL/L (ref 136–145)
TRIGL SERPL-MCNC: 93 MG/DL (ref 30–150)
TSH SERPL DL<=0.005 MIU/L-ACNC: 0.97 UIU/ML (ref 0.4–4)

## 2020-01-31 PROCEDURE — 36415 COLL VENOUS BLD VENIPUNCTURE: CPT | Mod: PO

## 2020-01-31 PROCEDURE — 80053 COMPREHEN METABOLIC PANEL: CPT

## 2020-01-31 PROCEDURE — 80061 LIPID PANEL: CPT

## 2020-01-31 PROCEDURE — 99999 PR PBB SHADOW E&M-EST. PATIENT-LVL IV: CPT | Mod: PBBFAC,,, | Performed by: INTERNAL MEDICINE

## 2020-01-31 PROCEDURE — 99214 OFFICE O/P EST MOD 30 MIN: CPT | Mod: PBBFAC,PO | Performed by: INTERNAL MEDICINE

## 2020-01-31 PROCEDURE — 99999 PR PBB SHADOW E&M-EST. PATIENT-LVL IV: ICD-10-PCS | Mod: PBBFAC,,, | Performed by: INTERNAL MEDICINE

## 2020-01-31 PROCEDURE — 84443 ASSAY THYROID STIM HORMONE: CPT

## 2020-01-31 PROCEDURE — 99214 PR OFFICE/OUTPT VISIT, EST, LEVL IV, 30-39 MIN: ICD-10-PCS | Mod: S$PBB,,, | Performed by: INTERNAL MEDICINE

## 2020-01-31 PROCEDURE — 99214 OFFICE O/P EST MOD 30 MIN: CPT | Mod: S$PBB,,, | Performed by: INTERNAL MEDICINE

## 2020-01-31 RX ORDER — LEVOTHYROXINE SODIUM 175 UG/1
175 TABLET ORAL DAILY
Qty: 90 TABLET | Refills: 1 | Status: CANCELLED | OUTPATIENT
Start: 2020-01-31

## 2020-01-31 RX ORDER — HYDROCHLOROTHIAZIDE 12.5 MG/1
25 TABLET ORAL DAILY
Qty: 90 TABLET | Refills: 1 | Status: CANCELLED | OUTPATIENT
Start: 2020-01-31 | End: 2020-04-30

## 2020-01-31 RX ORDER — MONTELUKAST SODIUM 10 MG/1
10 TABLET ORAL DAILY
Qty: 90 TABLET | Refills: 0 | Status: CANCELLED | OUTPATIENT
Start: 2020-01-31

## 2020-01-31 RX ORDER — METOPROLOL SUCCINATE 25 MG/1
25 TABLET, EXTENDED RELEASE ORAL DAILY
Qty: 90 TABLET | Refills: 1 | Status: SHIPPED | OUTPATIENT
Start: 2020-01-31 | End: 2020-08-06 | Stop reason: SDUPTHER

## 2020-01-31 RX ORDER — LOSARTAN POTASSIUM 25 MG/1
25 TABLET ORAL DAILY
Qty: 30 TABLET | Refills: 0 | Status: SHIPPED | OUTPATIENT
Start: 2020-01-31 | End: 2020-03-16

## 2020-01-31 RX ORDER — POTASSIUM CHLORIDE 20 MEQ/1
20 TABLET, EXTENDED RELEASE ORAL 2 TIMES DAILY
Qty: 180 TABLET | Refills: 0 | Status: CANCELLED | OUTPATIENT
Start: 2020-01-31

## 2020-01-31 RX ORDER — ALENDRONATE SODIUM 70 MG/1
70 TABLET ORAL
Qty: 12 TABLET | Refills: 1 | Status: SHIPPED | OUTPATIENT
Start: 2020-01-31 | End: 2020-07-15 | Stop reason: SDUPTHER

## 2020-01-31 RX ORDER — CETIRIZINE HYDROCHLORIDE 10 MG/1
10 TABLET ORAL DAILY
Qty: 90 TABLET | Refills: 1 | Status: SHIPPED | OUTPATIENT
Start: 2020-01-31

## 2020-01-31 RX ORDER — LEVOTHYROXINE SODIUM 175 UG/1
175 TABLET ORAL
Qty: 10 TABLET | Refills: 0 | Status: CANCELLED | OUTPATIENT
Start: 2020-01-31 | End: 2020-02-10

## 2020-01-31 NOTE — PROGRESS NOTES
Subjective:      Patient ID: Laura Yu is a 85 y.o. female.    Chief Complaint: Hypertension and Thyroid Problem (medication refills. )    HPI     84F here for follow up of chronic health conditions, including incontinence and urinary infections.  Last seen by me on 11/14/2019.  Unfortunately when she returned I was out sick from clinic so she was seen by my partner.  She had been advised by home health to be seen for urinary infection, however upon further pressing she reports to me that she was having discomfort over her back/flank region over a nodule that had been present for years and enlarging.  A urine was collected, which showed ESBL E coli.  She was started on Macrobid, however developed nausea and discontinued.  She cut back on her coffee and has been feeling better.  I had recommended initially referral to infectious disease considering that this was an ESBL and had multiple resistance to antibiotics.  She has an appointment with Urology on 02/10.  I advised her to still keep this appointment, but we discussed that we should change her blood pressure medication from thiazide to alternative to avoid her having increased urination.  She already suffers with stress and urge incontinence, therefore we will try to reduce by changing to angiotensin receptor blocker.  Her daughter is in agreement and we have pulled the HCTZ and potassium from her bag.  We discussed the concern about antibiotic resistance as well as ESBL and she is in agreement.  She will discuss at her Urology appointment and get back with me.  Blood pressure today is 140/77 and usually very controlled.  She has trace lower extremity edema and has been unable to obtain compression stockings due to the need for modified stockings.  We will follow up on this.  She has a prescription that was given by Pulmonary for Lasix that can be utilized if needed.    We reconcile all of her medications today as unfortunately the pharmacies are still  continuing to send request to my former practice and there being denied.  We have sent refills of all of her medications except the Synthroid as an updated TSH as needed.  As soon as this lab results we will send in Synthroid to both local pharmacy as well as her mail order.    She also would like to have evaluation done of the enlarging nodule on her back.  This has been present for years.  She saw Dermatology many years ago and at that time it was the size of a pea.  It is very discomforting and due to the location at times gets miss construed as flank pain.  We discussed getting ultrasound to further characterize and likely general surgery referral from there.    She otherwise is doing well and has found benefit to the Voltaren gel prescribed for her knee arthritis.  She still has home health.      Review of patient's allergies indicates:   Allergen Reactions    Hydrocodone-acetaminophen     Iodine and iodide containing products     Meperidine     Morphine     Penicillins     Sulfa (sulfonamide antibiotics)     Macrobid [nitrofurantoin monohyd/m-cryst] Nausea And Vomiting       Current Outpatient Medications:     fluticasone propionate (FLONASE) 50 mcg/actuation nasal spray, 1 spray by Nasal route once daily., Disp: , Rfl:     furosemide (LASIX) 20 MG tablet, Take 20 mg by mouth daily as needed. , Disp: , Rfl:     levothyroxine (SYNTHROID, LEVOTHROID) 175 MCG tablet, Take 175 mcg by mouth once daily., Disp: , Rfl:     montelukast (SINGULAIR) 10 mg tablet, Take 10 mg by mouth once daily., Disp: , Rfl:     multivitamin (THERAGRAN) per tablet, Take 1 tablet by mouth once daily., Disp: , Rfl:     niacin 500 MG Tab, Take 500 mg by mouth 2 (two) times daily., Disp: , Rfl:     rivaroxaban (XARELTO) 20 mg Tab, Take 20 mg by mouth once daily., Disp: , Rfl:     vit C,M-Cf-udeik-lutein-zeaxan (PRESERVISION AREDS 2) 331-954-18-1 mg-unit-mg-mg Cap, Take by mouth., Disp: , Rfl:     vitamin D (VITAMIN D3) 1000  units Tab, Take 2,000 Units by mouth once daily., Disp: , Rfl:     alendronate (FOSAMAX) 70 MG tablet, Take 1 tablet (70 mg total) by mouth every 7 days., Disp: 12 tablet, Rfl: 1    cetirizine (ZYRTEC) 10 MG tablet, Take 1 tablet (10 mg total) by mouth once daily., Disp: 90 tablet, Rfl: 1    cyanocobalamin (VITAMIN B-12) 500 MCG tablet, Take 500 mcg by mouth once daily., Disp: , Rfl:     diclofenac sodium (VOLTAREN) 1 % Gel, Apply 2 g topically once daily. Apply to knees (Patient not taking: Reported on 1/31/2020), Disp: 100 g, Rfl: 0    fluticasone furoate (ARNUITY ELLIPTA) 100 mcg/actuation DsDv, Inhale 1 puff into the lungs., Disp: , Rfl:     losartan (COZAAR) 25 MG tablet, Take 1 tablet (25 mg total) by mouth once daily., Disp: 30 tablet, Rfl: 0    metoprolol succinate (TOPROL-XL) 25 MG 24 hr tablet, Take 1 tablet (25 mg total) by mouth once daily., Disp: 90 tablet, Rfl: 1    pantoprazole (PROTONIX) 40 MG tablet, Take 1 tablet (40 mg total) by mouth once daily. (Patient not taking: Reported on 1/31/2020), Disp: 30 tablet, Rfl: 0    Past Medical History:   Diagnosis Date    Age-related osteoporosis with current pathological fracture with routine healing 3/20/2019    Chronic allergic rhinitis 2/16/2017    Deep vein thrombosis     Disorder of kidney and ureter     Essential hypertension 11/14/2019    Generalized osteoarthritis 6/5/2013    GERD (gastroesophageal reflux disease) 11/14/2019    Hx pulmonary embolism 4/3/2014    Had saddle embolism---2010 S/p IVC filter    Macular degeneration     Mixed hyperlipidemia 11/14/2019    Mixed stress and urge urinary incontinence 8/12/2014    Obstructive sleep apnea of adult 4/15/2019    Other specified hypothyroidism 6/5/2013    ICD-10 Transition    Paroxysmal atrial fibrillation 11/14/2019    Reactive airway disease 2/16/2017    S/P IVC filter      Past Surgical History:   Procedure Laterality Date    CATARACT EXTRACTION, BILATERAL       "CHOLECYSTECTOMY      FRACTURE SURGERY      ORIF left hip wtih IMN    CHIDI FILTER PLACEMENT      STOMACH SURGERY      TONSILLECTOMY       Family History   Problem Relation Age of Onset    Kidney disease Mother     Heart disease Father     Diabetes Sister     Asthma Daughter     Leukemia Daughter     Lymphoma Daughter     Breast cancer Other      Social History     Socioeconomic History    Marital status:      Spouse name: Not on file    Number of children: Not on file    Years of education: Not on file    Highest education level: Not on file   Occupational History    Not on file   Social Needs    Financial resource strain: Not on file    Food insecurity:     Worry: Not on file     Inability: Not on file    Transportation needs:     Medical: Not on file     Non-medical: Not on file   Tobacco Use    Smoking status: Never Smoker    Smokeless tobacco: Never Used   Substance and Sexual Activity    Alcohol use: Not Currently     Frequency: Never    Drug use: Never    Sexual activity: Not Currently     Birth control/protection: Post-menopausal   Lifestyle    Physical activity:     Days per week: Not on file     Minutes per session: Not on file    Stress: Only a little   Relationships    Social connections:     Talks on phone: Not on file     Gets together: Not on file     Attends Tenriism service: Not on file     Active member of club or organization: Not on file     Attends meetings of clubs or organizations: Not on file     Relationship status: Not on file   Other Topics Concern    Not on file   Social History Narrative    Not on file      Review of Systems      Objective:     Body mass index is 43.08 kg/m².  BP (!) 140/77 (BP Location: Left arm, Patient Position: Sitting, BP Method: Medium (Automatic))   Pulse 72   Temp 97.8 °F (36.6 °C)   Resp 16   Ht 5' 4" (1.626 m)   Wt 113.9 kg (251 lb)   SpO2 96%   BMI 43.08 kg/m²       Physical Exam   Constitutional: She is oriented " to person, place, and time. She appears well-developed and well-nourished. No distress.   Accompanied by daughter.  Ambulating via rolling walker.   Head: Normocephalic.   Mouth/Throat: Oropharynx is clear and moist. ulcer at right vermilion border  Eyes: Conjunctivae are normal.   Cardiovascular: Normal rate, regular rhythm, normal heart sounds and intact distal pulses.   No murmur heard.  Pulmonary/Chest: Effort normal and breath sounds normal.   Abdominal: Soft. Bowel sounds are normal.   Musculoskeletal: She exhibits edema (Trace-1+ bilateral lower). She exhibits no tenderness.   Crepitus appreciated in the right knee.  No appreciable joint effusions.   Lymphadenopathy: She has no cervical adenopathy.   Neurological: She is alert and oriented to person, place, and time. No sensory deficit.   Skin: Skin is warm and dry. Capillary refill takes 2 to 3 seconds. She is not diaphoretic.   Psychiatric: She has a normal mood and affect. Her behavior is normal.   Nursing note and vitals reviewed.              Lab Visit on 11/14/2019   Component Date Value Ref Range Status    WBC 11/14/2019 6.48  3.90 - 12.70 K/uL Final    RBC 11/14/2019 4.03  4.00 - 5.40 M/uL Final    Hemoglobin 11/14/2019 12.1  12.0 - 16.0 g/dL Final    Hematocrit 11/14/2019 39.1  37.0 - 48.5 % Final    Mean Corpuscular Volume 11/14/2019 97  82 - 98 fL Final    Mean Corpuscular Hemoglobin 11/14/2019 30.0  27.0 - 31.0 pg Final    Mean Corpuscular Hemoglobin Conc 11/14/2019 30.9* 32.0 - 36.0 g/dL Final    RDW 11/14/2019 13.7  11.5 - 14.5 % Final    Platelets 11/14/2019 145* 150 - 350 K/uL Final    MPV 11/14/2019 12.6  9.2 - 12.9 fL Final    Immature Granulocytes 11/14/2019 0.2  0.0 - 0.5 % Final    Gran # (ANC) 11/14/2019 4.1  1.8 - 7.7 K/uL Final    Immature Grans (Abs) 11/14/2019 0.01  0.00 - 0.04 K/uL Final    Comment: Mild elevation in immature granulocytes is non specific and   can be seen in a variety of conditions including stress  response,   acute inflammation, trauma and pregnancy. Correlation with other   laboratory and clinical findings is essential.      Lymph # 11/14/2019 1.7  1.0 - 4.8 K/uL Final    Mono # 11/14/2019 0.6  0.3 - 1.0 K/uL Final    Eos # 11/14/2019 0.1  0.0 - 0.5 K/uL Final    Baso # 11/14/2019 0.03  0.00 - 0.20 K/uL Final    nRBC 11/14/2019 0  0 /100 WBC Final    Gran% 11/14/2019 62.8  38.0 - 73.0 % Final    Lymph% 11/14/2019 26.7  18.0 - 48.0 % Final    Mono% 11/14/2019 8.6  4.0 - 15.0 % Final    Eosinophil% 11/14/2019 1.2  0.0 - 8.0 % Final    Basophil% 11/14/2019 0.5  0.0 - 1.9 % Final    Differential Method 11/14/2019 Automated   Final    Sodium 11/14/2019 142  136 - 145 mmol/L Final    Potassium 11/14/2019 4.0  3.5 - 5.1 mmol/L Final    Chloride 11/14/2019 102  95 - 110 mmol/L Final    CO2 11/14/2019 30* 23 - 29 mmol/L Final    Glucose 11/14/2019 95  70 - 110 mg/dL Final    BUN, Bld 11/14/2019 16  8 - 23 mg/dL Final    Creatinine 11/14/2019 0.8  0.5 - 1.4 mg/dL Final    Calcium 11/14/2019 9.3  8.7 - 10.5 mg/dL Final    Total Protein 11/14/2019 6.9  6.0 - 8.4 g/dL Final    Albumin 11/14/2019 3.8  3.5 - 5.2 g/dL Final    Total Bilirubin 11/14/2019 0.9  0.1 - 1.0 mg/dL Final    Comment: For infants and newborns, interpretation of results should be based  on gestational age, weight and in agreement with clinical  observations.  Premature Infant recommended reference ranges:  Up to 24 hours.............<8.0 mg/dL  Up to 48 hours............<12.0 mg/dL  3-5 days..................<15.0 mg/dL  6-29 days.................<15.0 mg/dL      Alkaline Phosphatase 11/14/2019 64  55 - 135 U/L Final    AST 11/14/2019 20  10 - 40 U/L Final    ALT 11/14/2019 11  10 - 44 U/L Final    Anion Gap 11/14/2019 10  8 - 16 mmol/L Final    eGFR if African American 11/14/2019 >60.0  >60 mL/min/1.73 m^2 Final    eGFR if non African American 11/14/2019 >60.0  >60 mL/min/1.73 m^2 Final    Comment: Calculation used to  obtain the estimated glomerular filtration  rate (eGFR) is the CKD-EPI equation.       Magnesium 11/14/2019 1.9  1.6 - 2.6 mg/dL Final    Hemoglobin A1C 11/14/2019 5.2  4.0 - 5.6 % Final    Comment: ADA Screening Guidelines:  5.7-6.4%  Consistent with prediabetes  >or=6.5%  Consistent with diabetes  High levels of fetal hemoglobin interfere with the HbA1C  assay. Heterozygous hemoglobin variants (HbS, HgC, etc)do  not significantly interfere with this assay.   However, presence of multiple variants may affect accuracy.      Estimated Avg Glucose 11/14/2019 103  68 - 131 mg/dL Final    Vit D, 25-Hydroxy 11/14/2019 43  30 - 96 ng/mL Final    Comment: Vitamin D deficiency.........<10 ng/mL                              Vitamin D insufficiency......10-29 ng/mL       Vitamin D sufficiency........> or equal to 30 ng/mL  Vitamin D toxicity............>100 ng/mL      Vitamin B-12 11/14/2019 >2000* 210 - 950 pg/mL Final    Ferritin 11/14/2019 37  20.0 - 300.0 ng/mL Final    Iron 11/14/2019 67  30 - 160 ug/dL Final    Transferrin 11/14/2019 286  200 - 375 mg/dL Final    TIBC 11/14/2019 423  250 - 450 ug/dL Final    Saturated Iron 11/14/2019 16* 20 - 50 % Final     No results found in the last 24 hours.   Assessment:     Encounter Diagnoses   Name Primary?    Subcutaneous nodule of back Yes    Essential hypertension     Age-related osteoporosis with current pathological fracture with routine healing     Mixed hyperlipidemia     Paroxysmal atrial fibrillation     Morbid obesity with BMI of 40.0-44.9, adult     Mixed stress and urge urinary incontinence     Mild intermittent reactive airway disease without complication     Screening for hematuria or proteinuria     Other specified hypothyroidism     Bilateral edema of lower extremity         Plan:     #Right knee pain/OA, left non-displaced tibial plateau fracture  -Saw Dr. Boucher on 7/18/19 with injection given (good relief). Has been using tylenol &  "voltaren gel with relief.   -No surgical intervention for fracture    #Macular degeneration  -referred to retina specialist by Dr. Magaña on 6/25/19. Continue to follow.     #MDD, in remission  Controlled. phq2 score 0. Previously on cymbalta 30 mg daily. Has d/c.     #Allergies, controlled   -Continue Singulair 10 mg daily + Cetirizine 10 mg daily + Flonase    #ALBERTO, controlled  -Following with Dr. Reynoso   -Sleep study 3/26/19: mild ALBERTO with o2 sat down to 80%.  -did have CPAP machine, but had to return due to cost.  We will follow up on this    #Osteoporosis  -9/7/17 DEXA. Repeat in 9/2020  -continue fosamax- refilled     #GERD, controlled  -Protonix 40 mg daily (has been using PRN)    #S/p left ureteral stent placement with urinary incontinence  #Recurrent "UTIs"  -Follows with Dr. Rosas - appt on 2/10/20  -will d/c hctz to reduce increased urination   -consider whether this is chronic colonization or stone. Discussed abx stewardship and resistance.      #History of saddle embolus requiring TPA in 2010  -continue chronic xarelto  -TTE: 4/10/19-normal EF (50-55%), dense thickening and calcification of the MV annulus, and RVSP of 26 mmHg. No evidence of pulmonary hypertension from this. Patient to see her cardiologist, Dr. Tono Talbot on 1/21/20.    #HTN, controlled  -STOP HCTZ 25 mg daily + K 20 meq daily   -START losartan 25 mg daily- f/up on BP   -3/29/19: gfr >60, microalbumin/cr 18, cr 0.84    #Paroxysmal afib, controlled.   -Toprol XL 25 mg daily + Xarelto 20 mg daily     #HLD   -Continue Niacin. Can't take statins due to myalgias   -3/29/19: lipid panel: chol 161, tri 71, hdl 52, ldl 94. Repeat.     #Hypothyroidism   -12/11/18: TSH 0.92. Repeat.  -Continue Synthroid 175 mcg daily     #Reactive airway disease, controlled   -PFT 1/17  -arnuity 100 daily  -following with dr. Sandy. Last seen 9/25/19    #Morbid obesity- BMI 43.08  -3/29/19: ha1c 5.6% --> 5.2% on 11/14/19    #Vitamin D deficiency  -3/29/19: vit " d 16.7, p 3.9, mg 1.9  -started on 1K daily    #Subcutaneous nodule of back   -likely lipoma  -obtain soft tissue ultrasound  -likely referral to gen surgery    #healthcare maintenance  -pneumonia vaccines up-to-date  -flu 9/25/19  -mammogram 10/15/18: birads 2. No further testing.  -3/29/19: b12 211  -shingrix rx given    Follow up in 1 month. [facility] Repeat labs.     Mary Ann Wylie      Orders Placed This Encounter   Procedures    US Soft Tissue Misc     Standing Status:   Future     Standing Expiration Date:   1/31/2021     Order Specific Question:   Reason for Exam:     Answer:   swelling on back     Order Specific Question:   May the Radiologist modify the order per protocol to meet the clinical needs of the patient?     Answer:   Yes    Comprehensive metabolic panel     Standing Status:   Future     Standing Expiration Date:   1/30/2021    Lipid panel     Standing Status:   Future     Standing Expiration Date:   1/30/2021    TSH     Standing Status:   Future     Standing Expiration Date:   1/30/2021    Microalbumin/creatinine urine ratio     Standing Status:   Future     Standing Expiration Date:   7/31/2021     Order Specific Question:   Specimen Source     Answer:   Urine

## 2020-02-02 RX ORDER — LEVOTHYROXINE SODIUM 175 UG/1
175 TABLET ORAL DAILY
Qty: 90 TABLET | Refills: 1 | Status: SHIPPED | OUTPATIENT
Start: 2020-02-02 | End: 2020-07-27 | Stop reason: SDUPTHER

## 2020-02-02 RX ORDER — LEVOTHYROXINE SODIUM 175 UG/1
175 TABLET ORAL DAILY
Qty: 30 TABLET | Refills: 0 | Status: SHIPPED | OUTPATIENT
Start: 2020-02-02 | End: 2020-02-02 | Stop reason: SDUPTHER

## 2020-02-03 ENCOUNTER — TELEPHONE (OUTPATIENT)
Dept: FAMILY MEDICINE | Facility: CLINIC | Age: 85
End: 2020-02-03

## 2020-02-03 NOTE — TELEPHONE ENCOUNTER
----- Message from Jerrica Stout sent at 2/3/2020  9:33 AM CST -----  Contact: Juliette-Daughter  Requesting a call back regarding medication and results .Please call back at [phone].      Thanks,  Jerrica Stout

## 2020-02-03 NOTE — TELEPHONE ENCOUNTER
----- Message from Komal Preciado sent at 2/3/2020  9:44 AM CST -----  Contact: pt  Type:  Patient Returning Call    Who Called: the pt  Who Left Message for Patient: Juliette  Does the patient know what this is regarding?: no  Would the patient rather a call back or a response via [employer]? Call back  Best Call Back Number: [phone]  Additional Information: n/a

## 2020-02-04 ENCOUNTER — TELEPHONE (OUTPATIENT)
Dept: FAMILY MEDICINE | Facility: CLINIC | Age: 85
End: 2020-02-04

## 2020-02-04 NOTE — TELEPHONE ENCOUNTER
Spoke to patient went over labs and medication again. Patient was concerned that a UA wasn't checked again, patient states that she doesn't have any symptoms but isn't sure if she should still follow up with Dr. Rosas due to the E. Coli that was previously in her urine. She would like to know if you are okay with her canceling her appointment with Dr. Rosas.

## 2020-02-04 NOTE — TELEPHONE ENCOUNTER
She can cancel her appt. The urine was on the lab orders, but I think that was the previous one we ordered for home health.     In the absence of symptoms she doesn't need a repeat.     Confirm she got her medications.

## 2020-02-04 NOTE — TELEPHONE ENCOUNTER
----- Message from Madison Mcdaniels sent at 2/4/2020  3:06 PM CST -----  Contact: Pt   States she has some questions about her test results / staets she has ecoli in her bladder and is wondering when she is supposed to see Dr Rosas or if it's cleared why does she has to see Dr Rosas and can be reached at [phone]//thanks/dbw

## 2020-02-05 ENCOUNTER — TELEPHONE (OUTPATIENT)
Dept: HOME HEALTH SERVICES | Facility: HOSPITAL | Age: 85
End: 2020-02-05

## 2020-02-06 NOTE — TELEPHONE ENCOUNTER
Spoke to patient regarding verifying a medication [facility] is asking for her to have refilled. patient was also informed about her urine question from 2/4/20.

## 2020-02-06 NOTE — TELEPHONE ENCOUNTER
----- Message from Ayaka High sent at 2/6/2020  9:29 AM CST -----  Contact: pt   Type:  Needs Medical Advice    Who Called: JAMAR BASHIR   Symptoms (please be specific):   How long has patient had these symptoms:    Pharmacy name and phone #:    Would the patient rather a call back or a response via [facility]? Call   Best Call Back Number: [phone]  Additional Information: Pt is requesting a call back from the nurse in regards to the pt  med alendronate

## 2020-02-17 ENCOUNTER — TELEPHONE (OUTPATIENT)
Dept: RADIOLOGY | Facility: HOSPITAL | Age: 85
End: 2020-02-17

## 2020-02-18 ENCOUNTER — HOSPITAL ENCOUNTER (OUTPATIENT)
Dept: RADIOLOGY | Facility: HOSPITAL | Age: 85
Discharge: HOME | End: 2020-02-18
Attending: INTERNAL MEDICINE
Payer: MEDICARE

## 2020-02-18 ENCOUNTER — TELEPHONE (OUTPATIENT)
Dept: FAMILY MEDICINE | Facility: CLINIC | Age: 85
End: 2020-02-18

## 2020-02-18 DIAGNOSIS — L72.9 CYST OF SKIN: Primary | ICD-10-CM

## 2020-02-18 DIAGNOSIS — R22.2 SUBCUTANEOUS NODULE OF BACK: ICD-10-CM

## 2020-02-18 PROCEDURE — 76705 PR US, ABDOMEN LIMITED: ICD-10-PCS | Mod: 26,,, | Performed by: RADIOLOGY

## 2020-02-18 PROCEDURE — 76999 ECHO EXAMINATION PROCEDURE: CPT | Mod: TC,PO

## 2020-02-18 PROCEDURE — 76705 ECHO EXAM OF ABDOMEN: CPT | Mod: 26,,, | Performed by: RADIOLOGY

## 2020-02-18 NOTE — TELEPHONE ENCOUNTER
----- Message from Mary Ann Wylie MD sent at 2/18/2020 11:34 AM CST -----  Complex cyst noted. 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     Would recommend referral to general surgery. Find out if she would like to stay local (within area), stay within [facility] (if so how far would they need to travel).     If the patient doesn't respond via [employer] that they have seen the test results via written confirmation please contact the patient.     Dr. Wylie

## 2020-02-18 NOTE — TELEPHONE ENCOUNTER
Pt called, notified of results. Pt verbalized understanding & would like to stay local with general surgery referral. [facility] if possible.

## 2020-02-19 ENCOUNTER — TELEPHONE (OUTPATIENT)
Dept: FAMILY MEDICINE | Facility: CLINIC | Age: 85
End: 2020-02-19

## 2020-02-19 NOTE — TELEPHONE ENCOUNTER
Pt called, notified of referral placed to Malena Morales MD - [place]. Pt given information to set up general surgery eval appointment.

## 2020-02-19 NOTE — TELEPHONE ENCOUNTER
----- Message from Brittany Hakan sent at 2/19/2020 12:29 PM CST -----  Type:  Needs Medical Advice    Who Called:  Pt  Laura  Symptoms (please be specific):   Needing her referral sent to the surgeon    How long has patient had these symptoms:     Pharmacy name and phone #:     Would the patient rather a call back or a response via [facility]?    Call back  Best Call Back Number:   [phone]  Additional Information:  Pt states she has made an appt with the surgeon and is needing your office to send the referral to them/her appt is scheduled on 2/21/20 at 10:30am//need to send to Dr Malena Morales//[phone]//does not have the fax number//please call//kacie/lh

## 2020-03-11 ENCOUNTER — TELEPHONE (OUTPATIENT)
Dept: FAMILY MEDICINE | Facility: CLINIC | Age: 85
End: 2020-03-11

## 2020-03-11 NOTE — TELEPHONE ENCOUNTER
Patient states that she has started back on the HCTZ and potassium and would like refills on both [facility] to [facility]. Patient is asking if she is needing to come in on Friday, she is concerned about coming to the doctors office with the concern of Carona virus.

## 2020-03-11 NOTE — TELEPHONE ENCOUNTER
I was not advised that the hctz and potassium had been restarted. She was supposed to only be on the losartan. I am worried about the risk of medication interaction or complications. Will need medication list confirmed and blood pressure readings. Confirm with home health when this change was made. Until then will need to hold on refills.

## 2020-03-11 NOTE — TELEPHONE ENCOUNTER
----- Message from Jr Resendiz sent at 3/11/2020  4:38 PM CDT -----  Contact: Aatr-[phone]  Pt would like a call from nurse in regards to question about her appt . Please call back at [phone].       Thank You,   Jr Resendiz

## 2020-03-15 PROCEDURE — G0179 MD RECERTIFICATION HHA PT: HCPCS | Mod: ,,, | Performed by: INTERNAL MEDICINE

## 2020-03-15 PROCEDURE — G0179 PR HOME HEALTH MD RECERTIFICATION: ICD-10-PCS | Mod: ,,, | Performed by: INTERNAL MEDICINE

## 2020-03-16 RX ORDER — HYDROCHLOROTHIAZIDE 12.5 MG/1
12.5 TABLET ORAL DAILY
Qty: 30 TABLET | Refills: 11
Start: 2020-03-16 | End: 2020-03-17 | Stop reason: SDUPTHER

## 2020-03-16 RX ORDER — PANTOPRAZOLE SODIUM 40 MG/1
40 TABLET, DELAYED RELEASE ORAL DAILY
Qty: 90 TABLET | Refills: 0 | Status: SHIPPED | OUTPATIENT
Start: 2020-03-16 | End: 2020-07-15 | Stop reason: SDUPTHER

## 2020-03-16 RX ORDER — MONTELUKAST SODIUM 10 MG/1
10 TABLET ORAL DAILY
Qty: 90 TABLET | Refills: 0 | Status: SHIPPED | OUTPATIENT
Start: 2020-03-16 | End: 2020-08-12 | Stop reason: SDUPTHER

## 2020-03-16 RX ORDER — POTASSIUM CHLORIDE 20 MEQ/1
20 TABLET, EXTENDED RELEASE ORAL 2 TIMES DAILY
Qty: 60 TABLET | Refills: 0
Start: 2020-03-16 | End: 2020-04-15

## 2020-03-16 NOTE — TELEPHONE ENCOUNTER
----- Message from Caro Altamirano sent at 3/16/2020  3:17 PM CDT -----  Patient needs call back to consult with nurse annmarie medication..[phone] (home)

## 2020-03-16 NOTE — TELEPHONE ENCOUNTER
Patient started herself back on these medications due to swelling, patients states that her BP is around 118/78 and the highest reading she has gotten is 140/80.     Patient is not taking the Losartan.

## 2020-03-17 ENCOUNTER — EXTERNAL HOME HEALTH (OUTPATIENT)
Dept: HOME HEALTH SERVICES | Facility: HOSPITAL | Age: 85
End: 2020-03-17
Payer: MEDICARE

## 2020-03-17 DIAGNOSIS — I10 ESSENTIAL HYPERTENSION: Primary | Chronic | ICD-10-CM

## 2020-03-17 RX ORDER — HYDROCHLOROTHIAZIDE 12.5 MG/1
12.5 TABLET ORAL DAILY
Qty: 90 TABLET | Refills: 0
Start: 2020-03-17 | End: 2020-03-17

## 2020-03-17 RX ORDER — HYDROCHLOROTHIAZIDE 12.5 MG/1
12.5 TABLET ORAL DAILY
Qty: 90 TABLET | Refills: 0 | Status: SHIPPED | OUTPATIENT
Start: 2020-03-17 | End: 2020-06-10 | Stop reason: SDUPTHER

## 2020-03-17 NOTE — TELEPHONE ENCOUNTER
I'm confused. She's already self discontinued the losartan and resumed the hctz. She should have had refills. They should be getting accurate medication reconciliation at every visit.

## 2020-03-17 NOTE — TELEPHONE ENCOUNTER
----- Message from Damion Resendiz sent at 3/17/2020 12:39 PM CDT -----  ..Type:  Patient Returning Call    Who Called: Andrew ( [facility]    Who Left Message for Patient:  Does the patient know what this is regarding? Medication changes   Would the patient rather a call back or a response via [employer]? Call back   Best Call Back Number: [phone]  Additional Information:Andrew ( [facility]   states she would like mto be taken off the losartan and placed on the HCTZ

## 2020-03-17 NOTE — TELEPHONE ENCOUNTER
Andrew [facility] [employer] is calling asking if patient could be taken off of the losartan potassium and would like to be placed back on the HCTZ  and get it refilled as this worked better. Please advise

## 2020-03-20 DIAGNOSIS — I10 ESSENTIAL HYPERTENSION: Chronic | ICD-10-CM

## 2020-03-20 RX ORDER — HYDROCHLOROTHIAZIDE 12.5 MG/1
TABLET ORAL
Qty: 60 TABLET | Refills: 0 | OUTPATIENT
Start: 2020-03-20

## 2020-03-20 NOTE — TELEPHONE ENCOUNTER
We just sent in to mail order.    And this says 2 tablets......she should be taking one.     Please confirm.

## 2020-04-22 ENCOUNTER — TELEPHONE (OUTPATIENT)
Dept: FAMILY MEDICINE | Facility: CLINIC | Age: 85
End: 2020-04-22

## 2020-04-22 NOTE — LETTER
April 22, 2020    Laura Yu  [address]  [facility] [address]             [facility]  [address] [address]  [facility] [address]  Phone: [phone]  Fax: [phone] Dear Ms. Laura Yu:    We are sorry that you canceled or missed your appointment with Dr. Wylie on 3/13/2020. Your health and follow-up medical care are important to us. Please call our office as soon as possible so that we may reschedule your appointment. If you have already rescheduled your appointment, please disregard this letter.     Sincerely,    Almita Johnson MA

## 2020-04-29 DIAGNOSIS — M17.0 PRIMARY OSTEOARTHRITIS OF BOTH KNEES: ICD-10-CM

## 2020-04-29 RX ORDER — DICLOFENAC SODIUM 10 MG/G
2 GEL TOPICAL DAILY
Qty: 100 G | Refills: 0 | Status: SHIPPED | OUTPATIENT
Start: 2020-04-29 | End: 2026-01-15

## 2020-04-29 NOTE — TELEPHONE ENCOUNTER
----- Message from Damion Resendiz sent at 4/29/2020 10:18 AM CDT -----  ..Type:  RX Refill Request    Who Called:    Refill or New Rx:refill   RX Name and Strength:Diclofenac 1% gal  How is the patient currently taking it? (ex. 1XDay): as needed   Is this a 30 day or 90 day RX: 30  Preferred Pharmacy with phone number:.  [facility]/pharmacy #[id number]  [place] - [address] [facility]  [address] [place] [address]  Phone: [phone] Fax: [phone]        Local or Mail Order: local  Ordering Provider  Would the patient rather a call back or a response via [facility]? Call back   Best Call Back Number [phone]  Additional Information:

## 2020-06-10 DIAGNOSIS — I10 ESSENTIAL HYPERTENSION: Chronic | ICD-10-CM

## 2020-06-10 RX ORDER — HYDROCHLOROTHIAZIDE 12.5 MG/1
12.5 TABLET ORAL DAILY
Qty: 90 TABLET | Refills: 0 | Status: SHIPPED | OUTPATIENT
Start: 2020-06-10 | End: 2020-08-12 | Stop reason: SDUPTHER

## 2020-06-23 DIAGNOSIS — Z13.21 ENCOUNTER FOR VITAMIN DEFICIENCY SCREENING: ICD-10-CM

## 2020-06-23 DIAGNOSIS — Z79.899 LONG TERM CURRENT USE OF DIURETIC: ICD-10-CM

## 2020-06-23 DIAGNOSIS — Z79.01 LONG TERM CURRENT USE OF ANTICOAGULANT THERAPY: Chronic | ICD-10-CM

## 2020-06-23 DIAGNOSIS — Z79.899 ENCOUNTER FOR LONG-TERM CURRENT USE OF MEDICATION: ICD-10-CM

## 2020-06-23 DIAGNOSIS — Z86.39 HISTORY OF VITAMIN D DEFICIENCY: ICD-10-CM

## 2020-06-23 DIAGNOSIS — I10 ESSENTIAL HYPERTENSION: ICD-10-CM

## 2020-06-23 DIAGNOSIS — E78.2 MIXED HYPERLIPIDEMIA: Primary | ICD-10-CM

## 2020-06-23 DIAGNOSIS — E03.8 OTHER SPECIFIED HYPOTHYROIDISM: ICD-10-CM

## 2020-06-23 DIAGNOSIS — R73.01 IMPAIRED FASTING GLUCOSE: ICD-10-CM

## 2020-06-23 DIAGNOSIS — Z13.0 SCREENING FOR DEFICIENCY ANEMIA: ICD-10-CM

## 2020-06-23 RX ORDER — METOPROLOL SUCCINATE 25 MG/1
25 TABLET, EXTENDED RELEASE ORAL DAILY
Qty: 90 TABLET | Refills: 1 | Status: CANCELLED | OUTPATIENT
Start: 2020-06-23

## 2020-06-23 RX ORDER — LEVOTHYROXINE SODIUM 175 UG/1
175 TABLET ORAL DAILY
Qty: 90 TABLET | Refills: 1 | Status: CANCELLED | OUTPATIENT
Start: 2020-06-23

## 2020-06-24 NOTE — TELEPHONE ENCOUNTER
Please schedule a visit with labs one week prior (fasting) with me at the end of July for my return in august. I will pend labs. Please advise me of dates and send back med for me to complete.

## 2020-07-15 ENCOUNTER — TELEPHONE (OUTPATIENT)
Dept: FAMILY MEDICINE | Facility: CLINIC | Age: 85
End: 2020-07-15

## 2020-07-15 DIAGNOSIS — M80.00XD AGE-RELATED OSTEOPOROSIS WITH CURRENT PATHOLOGICAL FRACTURE WITH ROUTINE HEALING: ICD-10-CM

## 2020-07-15 DIAGNOSIS — I48.0 PAROXYSMAL ATRIAL FIBRILLATION: Chronic | ICD-10-CM

## 2020-07-15 DIAGNOSIS — Z86.39 HISTORY OF VITAMIN D DEFICIENCY: ICD-10-CM

## 2020-07-15 DIAGNOSIS — R60.0 BILATERAL EDEMA OF LOWER EXTREMITY: ICD-10-CM

## 2020-07-15 DIAGNOSIS — Z79.899 LONG TERM CURRENT USE OF DIURETIC: ICD-10-CM

## 2020-07-15 DIAGNOSIS — K21.9 GASTROESOPHAGEAL REFLUX DISEASE, ESOPHAGITIS PRESENCE NOT SPECIFIED: Primary | ICD-10-CM

## 2020-07-15 DIAGNOSIS — I10 ESSENTIAL HYPERTENSION: Chronic | ICD-10-CM

## 2020-07-15 DIAGNOSIS — Z79.01 LONG TERM CURRENT USE OF ANTICOAGULANT THERAPY: Chronic | ICD-10-CM

## 2020-07-15 DIAGNOSIS — Z13.21 ENCOUNTER FOR VITAMIN DEFICIENCY SCREENING: ICD-10-CM

## 2020-07-15 DIAGNOSIS — Z79.899 ENCOUNTER FOR LONG-TERM CURRENT USE OF MEDICATION: ICD-10-CM

## 2020-07-15 DIAGNOSIS — Z13.0 SCREENING FOR DEFICIENCY ANEMIA: ICD-10-CM

## 2020-07-15 DIAGNOSIS — E78.2 MIXED HYPERLIPIDEMIA: Chronic | ICD-10-CM

## 2020-07-15 DIAGNOSIS — E03.8 OTHER SPECIFIED HYPOTHYROIDISM: Primary | Chronic | ICD-10-CM

## 2020-07-15 DIAGNOSIS — R73.01 IMPAIRED FASTING GLUCOSE: ICD-10-CM

## 2020-07-15 RX ORDER — FUROSEMIDE 20 MG/1
20 TABLET ORAL DAILY PRN
Qty: 90 TABLET | Refills: 0 | Status: SHIPPED | OUTPATIENT
Start: 2020-07-15 | End: 2021-01-14

## 2020-07-15 RX ORDER — PANTOPRAZOLE SODIUM 40 MG/1
40 TABLET, DELAYED RELEASE ORAL DAILY
Qty: 90 TABLET | Refills: 0 | Status: SHIPPED | OUTPATIENT
Start: 2020-07-15 | End: 2020-09-10

## 2020-07-15 RX ORDER — ALENDRONATE SODIUM 70 MG/1
70 TABLET ORAL
Qty: 12 TABLET | Refills: 0 | Status: SHIPPED | OUTPATIENT
Start: 2020-07-15 | End: 2020-09-10

## 2020-07-15 NOTE — TELEPHONE ENCOUNTER
----- Message from Keyona Guzman sent at 7/15/2020 10:03 AM CDT -----  Regarding: call back  Pt is requesting to speak with staff about a few things in regards to pharmacy and why has not any one gotten in contact with them. Please call back [phone]  Thanks

## 2020-07-15 NOTE — TELEPHONE ENCOUNTER
----- Message from Damion Resendiz sent at 7/15/2020 10:31 AM CDT -----  ..Type:  Patient Returning Call    Who Called: pt   Who Left Message for Patient   Does the patient know what this is regarding?: unknown   Would the patient rather a call back or a response via [facility]?  Call back   Best Call Back Number:[phone]  Additional Information: pt is returning a call from nurse

## 2020-07-16 NOTE — TELEPHONE ENCOUNTER
Patient notified of PCP preference. Patient states she will try to make appointment scheduled on 07/31. She will let us know if she needs to reschedule.

## 2020-07-23 RX ORDER — POTASSIUM CHLORIDE 750 MG/1
10 TABLET, EXTENDED RELEASE ORAL 2 TIMES DAILY
Qty: 60 TABLET | Refills: 0 | Status: SHIPPED | OUTPATIENT
Start: 2020-07-23 | End: 2020-08-12 | Stop reason: SDUPTHER

## 2020-07-23 NOTE — TELEPHONE ENCOUNTER
----- Message from Juliette Meyer sent at 7/23/2020  1:57 PM CDT -----  Name of Caller: pt  Reason for Visit/Symptoms: discuss meds and leg trouble   Best Contact Number or Confirm if Mychart Preferred [phone]  Preferred Date/Time of Appointment: sooner than 08/04  Interested in Virtual Visit (yes/no) yes  Additional Information:

## 2020-07-28 RX ORDER — LEVOTHYROXINE SODIUM 175 UG/1
175 TABLET ORAL DAILY
Qty: 90 TABLET | Refills: 0 | Status: SHIPPED | OUTPATIENT
Start: 2020-07-28 | End: 2020-09-14 | Stop reason: SDUPTHER

## 2020-08-05 ENCOUNTER — LAB VISIT (OUTPATIENT)
Dept: LAB | Facility: HOSPITAL | Age: 85
End: 2020-08-05
Attending: INTERNAL MEDICINE
Payer: MEDICARE

## 2020-08-05 DIAGNOSIS — I10 ESSENTIAL HYPERTENSION: ICD-10-CM

## 2020-08-05 DIAGNOSIS — R73.01 IMPAIRED FASTING GLUCOSE: ICD-10-CM

## 2020-08-05 DIAGNOSIS — Z13.0 SCREENING FOR DEFICIENCY ANEMIA: ICD-10-CM

## 2020-08-05 DIAGNOSIS — R60.0 BILATERAL EDEMA OF LOWER EXTREMITY: ICD-10-CM

## 2020-08-05 DIAGNOSIS — E03.8 OTHER SPECIFIED HYPOTHYROIDISM: ICD-10-CM

## 2020-08-05 DIAGNOSIS — Z79.01 LONG TERM CURRENT USE OF ANTICOAGULANT THERAPY: Chronic | ICD-10-CM

## 2020-08-05 DIAGNOSIS — Z79.899 LONG TERM CURRENT USE OF DIURETIC: ICD-10-CM

## 2020-08-05 DIAGNOSIS — I48.0 PAROXYSMAL ATRIAL FIBRILLATION: Chronic | ICD-10-CM

## 2020-08-05 DIAGNOSIS — Z13.21 ENCOUNTER FOR VITAMIN DEFICIENCY SCREENING: ICD-10-CM

## 2020-08-05 DIAGNOSIS — Z86.39 HISTORY OF VITAMIN D DEFICIENCY: ICD-10-CM

## 2020-08-05 DIAGNOSIS — E78.2 MIXED HYPERLIPIDEMIA: ICD-10-CM

## 2020-08-05 DIAGNOSIS — Z79.899 ENCOUNTER FOR LONG-TERM CURRENT USE OF MEDICATION: ICD-10-CM

## 2020-08-05 LAB
ALBUMIN SERPL BCP-MCNC: 3.8 G/DL (ref 3.5–5.2)
ALP SERPL-CCNC: 65 U/L (ref 55–135)
ALT SERPL W/O P-5'-P-CCNC: 11 U/L (ref 10–44)
ANION GAP SERPL CALC-SCNC: 8 MMOL/L (ref 8–16)
AST SERPL-CCNC: 19 U/L (ref 10–40)
BILIRUB SERPL-MCNC: 0.8 MG/DL (ref 0.1–1)
BUN SERPL-MCNC: 19 MG/DL (ref 8–23)
CALCIUM SERPL-MCNC: 9.4 MG/DL (ref 8.7–10.5)
CHLORIDE SERPL-SCNC: 102 MMOL/L (ref 95–110)
CHOLEST SERPL-MCNC: 172 MG/DL (ref 120–199)
CHOLEST/HDLC SERPL: 2.9 {RATIO} (ref 2–5)
CO2 SERPL-SCNC: 33 MMOL/L (ref 23–29)
CREAT SERPL-MCNC: 0.8 MG/DL (ref 0.5–1.4)
EST. GFR  (AFRICAN AMERICAN): >60 ML/MIN/1.73 M^2
EST. GFR  (NON AFRICAN AMERICAN): >60 ML/MIN/1.73 M^2
GLUCOSE SERPL-MCNC: 88 MG/DL (ref 70–110)
HDLC SERPL-MCNC: 59 MG/DL (ref 40–75)
HDLC SERPL: 34.3 % (ref 20–50)
LDLC SERPL CALC-MCNC: 96 MG/DL (ref 63–159)
NONHDLC SERPL-MCNC: 113 MG/DL
POTASSIUM SERPL-SCNC: 4 MMOL/L (ref 3.5–5.1)
PROT SERPL-MCNC: 6.9 G/DL (ref 6–8.4)
SODIUM SERPL-SCNC: 143 MMOL/L (ref 136–145)
TRIGL SERPL-MCNC: 85 MG/DL (ref 30–150)
TSH SERPL DL<=0.005 MIU/L-ACNC: 1.04 UIU/ML (ref 0.4–4)

## 2020-08-05 PROCEDURE — 82607 VITAMIN B-12: CPT

## 2020-08-05 PROCEDURE — 83036 HEMOGLOBIN GLYCOSYLATED A1C: CPT

## 2020-08-05 PROCEDURE — 85025 COMPLETE CBC W/AUTO DIFF WBC: CPT

## 2020-08-05 PROCEDURE — 83921 ORGANIC ACID SINGLE QUANT: CPT

## 2020-08-05 PROCEDURE — 80061 LIPID PANEL: CPT

## 2020-08-05 PROCEDURE — 83735 ASSAY OF MAGNESIUM: CPT

## 2020-08-05 PROCEDURE — 80053 COMPREHEN METABOLIC PANEL: CPT

## 2020-08-05 PROCEDURE — 36415 COLL VENOUS BLD VENIPUNCTURE: CPT | Mod: PO

## 2020-08-05 PROCEDURE — 82306 VITAMIN D 25 HYDROXY: CPT

## 2020-08-05 PROCEDURE — 84443 ASSAY THYROID STIM HORMONE: CPT

## 2020-08-06 DIAGNOSIS — I10 ESSENTIAL HYPERTENSION: Primary | ICD-10-CM

## 2020-08-06 DIAGNOSIS — Z78.0 POSTMENOPAUSAL: ICD-10-CM

## 2020-08-06 DIAGNOSIS — M80.00XD AGE-RELATED OSTEOPOROSIS WITH CURRENT PATHOLOGICAL FRACTURE WITH ROUTINE HEALING: Primary | Chronic | ICD-10-CM

## 2020-08-06 DIAGNOSIS — Z13.820 SCREENING FOR OSTEOPOROSIS: ICD-10-CM

## 2020-08-06 PROBLEM — D69.6 THROMBOCYTOPENIA: Chronic | Status: ACTIVE | Noted: 2020-08-06

## 2020-08-06 LAB
25(OH)D3+25(OH)D2 SERPL-MCNC: 33 NG/ML (ref 30–96)
BASOPHILS # BLD AUTO: 0.04 K/UL (ref 0–0.2)
BASOPHILS NFR BLD: 0.7 % (ref 0–1.9)
DIFFERENTIAL METHOD: ABNORMAL
EOSINOPHIL # BLD AUTO: 0.1 K/UL (ref 0–0.5)
EOSINOPHIL NFR BLD: 1.9 % (ref 0–8)
ERYTHROCYTE [DISTWIDTH] IN BLOOD BY AUTOMATED COUNT: 13.9 % (ref 11.5–14.5)
ESTIMATED AVG GLUCOSE: 103 MG/DL (ref 68–131)
HBA1C MFR BLD HPLC: 5.2 % (ref 4–5.6)
HCT VFR BLD AUTO: 41.8 % (ref 37–48.5)
HGB BLD-MCNC: 12.8 G/DL (ref 12–16)
IMM GRANULOCYTES # BLD AUTO: 0.01 K/UL (ref 0–0.04)
IMM GRANULOCYTES NFR BLD AUTO: 0.2 % (ref 0–0.5)
LYMPHOCYTES # BLD AUTO: 1.3 K/UL (ref 1–4.8)
LYMPHOCYTES NFR BLD: 22.6 % (ref 18–48)
MAGNESIUM SERPL-MCNC: 2 MG/DL (ref 1.6–2.6)
MCH RBC QN AUTO: 29.9 PG (ref 27–31)
MCHC RBC AUTO-ENTMCNC: 30.6 G/DL (ref 32–36)
MCV RBC AUTO: 98 FL (ref 82–98)
MONOCYTES # BLD AUTO: 0.4 K/UL (ref 0.3–1)
MONOCYTES NFR BLD: 7.2 % (ref 4–15)
NEUTROPHILS # BLD AUTO: 3.9 K/UL (ref 1.8–7.7)
NEUTROPHILS NFR BLD: 67.4 % (ref 38–73)
NRBC BLD-RTO: 0 /100 WBC
PLATELET # BLD AUTO: 146 K/UL (ref 150–350)
PMV BLD AUTO: 12.9 FL (ref 9.2–12.9)
RBC # BLD AUTO: 4.28 M/UL (ref 4–5.4)
VIT B12 SERPL-MCNC: >2000 PG/ML (ref 210–950)
WBC # BLD AUTO: 5.83 K/UL (ref 3.9–12.7)

## 2020-08-06 RX ORDER — METOPROLOL SUCCINATE 25 MG/1
25 TABLET, EXTENDED RELEASE ORAL DAILY
Qty: 90 TABLET | Refills: 1 | Status: CANCELLED | OUTPATIENT
Start: 2020-08-06

## 2020-08-06 RX ORDER — METOPROLOL SUCCINATE 25 MG/1
25 TABLET, EXTENDED RELEASE ORAL DAILY
Qty: 90 TABLET | Refills: 1 | Status: SHIPPED | OUTPATIENT
Start: 2020-08-06 | End: 2022-03-11 | Stop reason: SDUPTHER

## 2020-08-11 LAB — METHYLMALONATE SERPL-SCNC: 0.26 UMOL/L

## 2020-08-12 ENCOUNTER — OFFICE VISIT (OUTPATIENT)
Dept: FAMILY MEDICINE | Facility: CLINIC | Age: 85
End: 2020-08-12
Payer: MEDICARE

## 2020-08-12 DIAGNOSIS — Z79.899 LONG TERM CURRENT USE OF DIURETIC: ICD-10-CM

## 2020-08-12 DIAGNOSIS — Z71.2 ENCOUNTER TO DISCUSS TEST RESULTS: Primary | ICD-10-CM

## 2020-08-12 DIAGNOSIS — E03.8 OTHER SPECIFIED HYPOTHYROIDISM: Chronic | ICD-10-CM

## 2020-08-12 DIAGNOSIS — E78.2 MIXED HYPERLIPIDEMIA: Chronic | ICD-10-CM

## 2020-08-12 DIAGNOSIS — R60.0 BILATERAL EDEMA OF LOWER EXTREMITY: Chronic | ICD-10-CM

## 2020-08-12 DIAGNOSIS — F41.9 ANXIETY: ICD-10-CM

## 2020-08-12 DIAGNOSIS — J30.9 CHRONIC ALLERGIC RHINITIS: Chronic | ICD-10-CM

## 2020-08-12 DIAGNOSIS — D69.6 THROMBOCYTOPENIA: Chronic | ICD-10-CM

## 2020-08-12 DIAGNOSIS — J45.20 MILD INTERMITTENT REACTIVE AIRWAY DISEASE WITHOUT COMPLICATION: Chronic | ICD-10-CM

## 2020-08-12 DIAGNOSIS — N39.46 MIXED STRESS AND URGE URINARY INCONTINENCE: Chronic | ICD-10-CM

## 2020-08-12 DIAGNOSIS — Z79.01 LONG TERM CURRENT USE OF ANTICOAGULANT THERAPY: Chronic | ICD-10-CM

## 2020-08-12 DIAGNOSIS — I48.0 PAROXYSMAL ATRIAL FIBRILLATION: Chronic | ICD-10-CM

## 2020-08-12 DIAGNOSIS — I10 ESSENTIAL HYPERTENSION: Chronic | ICD-10-CM

## 2020-08-12 DIAGNOSIS — K21.9 GASTROESOPHAGEAL REFLUX DISEASE, ESOPHAGITIS PRESENCE NOT SPECIFIED: Chronic | ICD-10-CM

## 2020-08-12 DIAGNOSIS — M80.00XD AGE-RELATED OSTEOPOROSIS WITH CURRENT PATHOLOGICAL FRACTURE WITH ROUTINE HEALING: Chronic | ICD-10-CM

## 2020-08-12 DIAGNOSIS — F33.42 RECURRENT MAJOR DEPRESSIVE DISORDER, IN FULL REMISSION: ICD-10-CM

## 2020-08-12 PROCEDURE — 99442 PR PHYSICIAN TELEPHONE EVALUATION 11-20 MIN: CPT | Mod: 95,,, | Performed by: INTERNAL MEDICINE

## 2020-08-12 PROCEDURE — 99442 PR PHYSICIAN TELEPHONE EVALUATION 11-20 MIN: ICD-10-PCS | Mod: 95,,, | Performed by: INTERNAL MEDICINE

## 2020-08-12 RX ORDER — HYDROCHLOROTHIAZIDE 12.5 MG/1
12.5 TABLET ORAL DAILY
Qty: 90 TABLET | Refills: 1 | Status: SHIPPED | OUTPATIENT
Start: 2020-08-12 | End: 2021-01-20

## 2020-08-12 RX ORDER — MONTELUKAST SODIUM 10 MG/1
10 TABLET ORAL DAILY
Qty: 90 TABLET | Refills: 0 | Status: SHIPPED | OUTPATIENT
Start: 2020-08-12 | End: 2020-12-08

## 2020-08-12 RX ORDER — POTASSIUM CHLORIDE 20 MEQ/1
20 TABLET, EXTENDED RELEASE ORAL DAILY
Qty: 90 TABLET | Refills: 1 | Status: SHIPPED | OUTPATIENT
Start: 2020-08-12 | End: 2021-01-29

## 2020-08-12 NOTE — PROGRESS NOTES
Subjective:      Patient ID: Laura Yu is a 85 y.o. female     Chief Complaint: Hypertension      Established Patient - Audio Only Telehealth Visit     The patient location is: home  The chief complaint leading to consultation is: follow up  Visit type: Virtual visit with audio only (telephone)  Total time spent with patient: 20    The reason for the audio only service rather than synchronous audio and video virtual visit was related to technical difficulties or patient preference/necessity.     Each patient to whom I provide medical services by telemedicine is:  (1) informed of the relationship between the physician and patient and the respective role of any other health care provider with respect to management of the patient; and (2) notified that they may decline to receive medical services by telemedicine and may withdraw from such care at any time. Patient verbally consented to receive this service via voice-only telephone call.      HPI     85F here to review test results and f/u chronic health conditions. The patient's last visit with me was on 1/31/2020.    Since our last visit she had a cyst removed from her back by Dr. Morales. She has been trying to stay home to avoid exposure to COVID. She has resumed the HCTZ (last visit d/c and changed to ARB to help with incontinence, however, she felt b/p and fluid was not controlled and resumed). We review all her medications and refills given. We discuss lab results. Platelets low, but likely 2/2 NOAC. No bruising endorsed. She is still having knee pain, but does find relief with the voltaren gel. We discussed compound cream potentially in future if still painful. We are avoiding nsaids given low platelets and noac use. No recent utis.    She reports mood as stable. phq2 score 0.     We discuss importance of repeat dexa. She is compliant with fosamax. Vitamin D level has been stable. She is taking 1K units daily. b12 level stable- taking 500 mcg daily.  Niacin prescribed by cards. She does report more physical deconditioning and previously had home health. She is considering resuming, but has been apprehensive with covid. Advised to reach out to me if she would like to resume. I have ensured all medications were sent to her mail order and locally if needed. She is otherwise doing ok and has support at home.       Review of patient's allergies indicates:   Allergen Reactions    Hydrocodone-acetaminophen     Iodine and iodide containing products     Meperidine     Morphine     Penicillins     Sulfa (sulfonamide antibiotics)     Macrobid [nitrofurantoin monohyd/m-cryst] Nausea And Vomiting       Current Outpatient Medications:     alendronate (FOSAMAX) 70 MG tablet, Take 1 tablet (70 mg total) by mouth every 7 days., Disp: 12 tablet, Rfl: 0    cetirizine (ZYRTEC) 10 MG tablet, Take 1 tablet (10 mg total) by mouth once daily., Disp: 90 tablet, Rfl: 1    cyanocobalamin (VITAMIN B-12) 500 MCG tablet, Take 500 mcg by mouth once daily., Disp: , Rfl:     diclofenac sodium (VOLTAREN) 1 % Gel, Apply 2 g topically once daily. Apply to knees, Disp: 100 g, Rfl: 0    fluticasone furoate (ARNUITY ELLIPTA) 100 mcg/actuation DsDv, Inhale 1 puff into the lungs., Disp: , Rfl:     fluticasone propionate (FLONASE) 50 mcg/actuation nasal spray, 1 spray by Nasal route once daily., Disp: , Rfl:     furosemide (LASIX) 20 MG tablet, Take 1 tablet (20 mg total) by mouth daily as needed., Disp: 90 tablet, Rfl: 0    hydroCHLOROthiazide (HYDRODIURIL) 12.5 MG Tab, Take 1 tablet (12.5 mg total) by mouth once daily., Disp: 90 tablet, Rfl: 1    levothyroxine (SYNTHROID, LEVOTHROID) 175 MCG tablet, Take 1 tablet (175 mcg total) by mouth once daily., Disp: 90 tablet, Rfl: 0    metoprolol succinate (TOPROL-XL) 25 MG 24 hr tablet, Take 1 tablet (25 mg total) by mouth once daily., Disp: 90 tablet, Rfl: 1    montelukast (SINGULAIR) 10 mg tablet, Take 1 tablet (10 mg total) by mouth once  daily., Disp: 90 tablet, Rfl: 0    multivitamin (THERAGRAN) per tablet, Take 1 tablet by mouth once daily., Disp: , Rfl:     niacin 500 MG Tab, Take 500 mg by mouth 2 (two) times daily., Disp: , Rfl:     pantoprazole (PROTONIX) 40 MG tablet, Take 1 tablet (40 mg total) by mouth once daily., Disp: 90 tablet, Rfl: 0    potassium chloride SA (K-DUR,KLOR-CON) 20 MEQ tablet, Take 1 tablet (20 mEq total) by mouth once daily., Disp: 90 tablet, Rfl: 1    rivaroxaban (XARELTO) 20 mg Tab, Take 20 mg by mouth once daily., Disp: , Rfl:     vit C,A-Rm-rryuq-lutein-zeaxan (PRESERVISION AREDS 2) 123-633-10-1 mg-unit-mg-mg Cap, Take by mouth., Disp: , Rfl:     vitamin D (VITAMIN D3) 1000 units Tab, Take 2,000 Units by mouth once daily., Disp: , Rfl:     Past Medical History:   Diagnosis Date    Age-related osteoporosis with current pathological fracture with routine healing 3/20/2019    Chronic allergic rhinitis 2/16/2017    Closed fracture of left tibial plateau 6/20/2019    Cyst of skin 2/18/2020    Deep vein thrombosis     Disorder of kidney and ureter     Essential hypertension 11/14/2019    Generalized osteoarthritis 6/5/2013    GERD (gastroesophageal reflux disease) 11/14/2019    Hx pulmonary embolism 4/3/2014    Had saddle embolism---2010 S/p IVC filter    Macular degeneration     Mixed hyperlipidemia 11/14/2019    Mixed stress and urge urinary incontinence 8/12/2014    Obstructive sleep apnea of adult 4/15/2019    Other specified hypothyroidism 6/5/2013    ICD-10 Transition    Paroxysmal atrial fibrillation 11/14/2019    Reactive airway disease 2/16/2017    S/P IVC filter     Thrombocytopenia 8/6/2020    Vitamin D deficiency 11/14/2019     Past Surgical History:   Procedure Laterality Date    CATARACT EXTRACTION, BILATERAL      CHOLECYSTECTOMY      FRACTURE SURGERY      ORIF left hip wtih IMN    CHIDI FILTER PLACEMENT      STOMACH SURGERY      TONSILLECTOMY       Family History    Problem Relation Age of Onset    Kidney disease Mother     Heart disease Father     Diabetes Sister     Asthma Daughter     Leukemia Daughter     Lymphoma Daughter     Breast cancer Other      Social History     Socioeconomic History    Marital status:      Spouse name: Not on file    Number of children: Not on file    Years of education: Not on file    Highest education level: Not on file   Occupational History    Not on file   Social Needs    Financial resource strain: Not on file    Food insecurity     Worry: Not on file     Inability: Not on file    Transportation needs     Medical: Not on file     Non-medical: Not on file   Tobacco Use    Smoking status: Never Smoker    Smokeless tobacco: Never Used   Substance and Sexual Activity    Alcohol use: Not Currently     Frequency: Never    Drug use: Never    Sexual activity: Not Currently     Birth control/protection: Post-menopausal   Lifestyle    Physical activity     Days per week: Not on file     Minutes per session: Not on file    Stress: Only a little   Relationships    Social connections     Talks on phone: Not on file     Gets together: Not on file     Attends Jain service: Not on file     Active member of club or organization: Not on file     Attends meetings of clubs or organizations: Not on file     Relationship status: Not on file   Other Topics Concern    Not on file   Social History Narrative    Not on file       Review of Systems   Constitutional: Negative for chills and fever.   HENT: Negative.    Eyes: Negative.    Respiratory: Negative for cough and shortness of breath.    Cardiovascular: Negative for chest pain and palpitations.   Gastrointestinal: Negative for abdominal pain.   Endocrine: Negative.    Genitourinary: Negative for dysuria and hematuria.   Musculoskeletal: Positive for arthralgias.   Skin: Negative.    Allergic/Immunologic: Negative.    Neurological: Negative for weakness.   Hematological:  Negative for adenopathy. Does not bruise/bleed easily.   Psychiatric/Behavioral: Negative.        Objective:     There is no height or weight on file to calculate BMI.  There were no vitals taken for this visit.      Physical Exam   Audio visit       Lab Visit on 08/05/2020   Component Date Value Ref Range Status    Magnesium 08/05/2020 2.0  1.6 - 2.6 mg/dL Final    Vit D, 25-Hydroxy 08/05/2020 33  30 - 96 ng/mL Final    Comment: Vitamin D deficiency.........<10 ng/mL                              Vitamin D insufficiency......10-29 ng/mL       Vitamin D sufficiency........> or equal to 30 ng/mL  Vitamin D toxicity............>100 ng/mL      Hemoglobin A1C 08/05/2020 5.2  4.0 - 5.6 % Final    Comment: ADA Screening Guidelines:  5.7-6.4%  Consistent with prediabetes  >or=6.5%  Consistent with diabetes  High levels of fetal hemoglobin interfere with the HbA1C  assay. Heterozygous hemoglobin variants (HbS, HgC, etc)do  not significantly interfere with this assay.   However, presence of multiple variants may affect accuracy.      Estimated Avg Glucose 08/05/2020 103  68 - 131 mg/dL Final    Methlymalonic Acid 08/05/2020 0.26  <0.40 umol/L Final    Comment: If applicable, any drug confirmation testing reported  here was developed and the performance characteristics  determined by [facility]. This   confirmation testing has not been cleared or approved  by the FDA. The laboratory is regulated under CLIA as  qualified to perform high-complexity testing. This test  is used for patient testing purposes. It should not be  regarded as investigational or for research.  Test performed at [facility],  [address] , [place]  [address]     [phone]  Luke Bradshaw MD  - Medical Director      Vitamin B-12 08/05/2020 >2000* 210 - 950 pg/mL Final    Sodium 08/05/2020 143  136 - 145 mmol/L Final    Potassium 08/05/2020 4.0  3.5 - 5.1 mmol/L Final    Chloride 08/05/2020 102  95 - 110  mmol/L Final    CO2 08/05/2020 33* 23 - 29 mmol/L Final    Glucose 08/05/2020 88  70 - 110 mg/dL Final    BUN, Bld 08/05/2020 19  8 - 23 mg/dL Final    Creatinine 08/05/2020 0.8  0.5 - 1.4 mg/dL Final    Calcium 08/05/2020 9.4  8.7 - 10.5 mg/dL Final    Total Protein 08/05/2020 6.9  6.0 - 8.4 g/dL Final    Albumin 08/05/2020 3.8  3.5 - 5.2 g/dL Final    Total Bilirubin 08/05/2020 0.8  0.1 - 1.0 mg/dL Final    Comment: For infants and newborns, interpretation of results should be based  on gestational age, weight and in agreement with clinical  observations.  Premature Infant recommended reference ranges:  Up to 24 hours.............<8.0 mg/dL  Up to 48 hours............<12.0 mg/dL  3-5 days..................<15.0 mg/dL  6-29 days.................<15.0 mg/dL      Alkaline Phosphatase 08/05/2020 65  55 - 135 U/L Final    AST 08/05/2020 19  10 - 40 U/L Final    ALT 08/05/2020 11  10 - 44 U/L Final    Anion Gap 08/05/2020 8  8 - 16 mmol/L Final    eGFR if African American 08/05/2020 >60.0  >60 mL/min/1.73 m^2 Final    eGFR if non African American 08/05/2020 >60.0  >60 mL/min/1.73 m^2 Final    Comment: Calculation used to obtain the estimated glomerular filtration  rate (eGFR) is the CKD-EPI equation.       TSH 08/05/2020 1.044  0.400 - 4.000 uIU/mL Final    Cholesterol 08/05/2020 172  120 - 199 mg/dL Final    Comment: The National Cholesterol Education Program (NCEP) has set the  following guidelines (reference ranges) for Cholesterol:  Optimal.....................<200 mg/dL  Borderline High.............200-239 mg/dL  High........................> or = 240 mg/dL      Triglycerides 08/05/2020 85  30 - 150 mg/dL Final    Comment: The National Cholesterol Education Program (NCEP) has set the  following guidelines (reference values) for triglycerides:  Normal......................<150 mg/dL  Borderline High.............150-199 mg/dL  High........................200-499 mg/dL      HDL 08/05/2020 59  40 - 75  mg/dL Final    Comment: The National Cholesterol Education Program (NCEP) has set the  following guidelines (reference values) for HDL Cholesterol:  Low...............<40 mg/dL  Optimal...........>60 mg/dL      LDL Cholesterol 08/05/2020 96.0  63.0 - 159.0 mg/dL Final    Comment: The National Cholesterol Education Program (NCEP) has set the  following guidelines (reference values) for LDL Cholesterol:  Optimal.......................<130 mg/dL  Borderline High...............130-159 mg/dL  High..........................160-189 mg/dL  Very High.....................>190 mg/dL      Hdl/Cholesterol Ratio 08/05/2020 34.3  20.0 - 50.0 % Final    Total Cholesterol/HDL Ratio 08/05/2020 2.9  2.0 - 5.0 Final    Non-HDL Cholesterol 08/05/2020 113  mg/dL Final    Comment: Risk category and Non-HDL cholesterol goals:  Coronary heart disease (CHD)or equivalent (10-year risk of CHD >20%):  Non-HDL cholesterol goal     <130 mg/dL  Two or more CHD risk factors and 10-year risk of CHD <= 20%:  Non-HDL cholesterol goal     <160 mg/dL  0 to 1 CHD risk factor:  Non-HDL cholesterol goal     <190 mg/dL      WBC 08/05/2020 5.83  3.90 - 12.70 K/uL Final    RBC 08/05/2020 4.28  4.00 - 5.40 M/uL Final    Hemoglobin 08/05/2020 12.8  12.0 - 16.0 g/dL Final    Hematocrit 08/05/2020 41.8  37.0 - 48.5 % Final    Mean Corpuscular Volume 08/05/2020 98  82 - 98 fL Final    Mean Corpuscular Hemoglobin 08/05/2020 29.9  27.0 - 31.0 pg Final    Mean Corpuscular Hemoglobin Conc 08/05/2020 30.6* 32.0 - 36.0 g/dL Final    RDW 08/05/2020 13.9  11.5 - 14.5 % Final    Platelets 08/05/2020 146* 150 - 350 K/uL Final    MPV 08/05/2020 12.9  9.2 - 12.9 fL Final    Immature Granulocytes 08/05/2020 0.2  0.0 - 0.5 % Final    Gran # (ANC) 08/05/2020 3.9  1.8 - 7.7 K/uL Final    Immature Grans (Abs) 08/05/2020 0.01  0.00 - 0.04 K/uL Final    Comment: Mild elevation in immature granulocytes is non specific and   can be seen in a variety of conditions  including stress response,   acute inflammation, trauma and pregnancy. Correlation with other   laboratory and clinical findings is essential.      Lymph # 08/05/2020 1.3  1.0 - 4.8 K/uL Final    Mono # 08/05/2020 0.4  0.3 - 1.0 K/uL Final    Eos # 08/05/2020 0.1  0.0 - 0.5 K/uL Final    Baso # 08/05/2020 0.04  0.00 - 0.20 K/uL Final    nRBC 08/05/2020 0  0 /100 WBC Final    Gran% 08/05/2020 67.4  38.0 - 73.0 % Final    Lymph% 08/05/2020 22.6  18.0 - 48.0 % Final    Mono% 08/05/2020 7.2  4.0 - 15.0 % Final    Eosinophil% 08/05/2020 1.9  0.0 - 8.0 % Final    Basophil% 08/05/2020 0.7  0.0 - 1.9 % Final    Differential Method 08/05/2020 Automated   Final     No results found in the last 24 hours.   Assessment:     Encounter Diagnoses   Name Primary?    Encounter to discuss test results Yes    Recurrent major depressive disorder, in full remission     Anxiety     Essential hypertension     Long term current use of anticoagulant therapy     Long term current use of diuretic     Mixed hyperlipidemia     Paroxysmal atrial fibrillation     Mixed stress and urge urinary incontinence     Thrombocytopenia     Other specified hypothyroidism     Gastroesophageal reflux disease, esophagitis presence not specified     Age-related osteoporosis with current pathological fracture with routine healing     Bilateral edema of lower extremity     Chronic allergic rhinitis     Mild intermittent reactive airway disease without complication         Plan:     #Test results  - greater than 50% of the visit spent in reviewing test results with patient and developing plan for further work up and recommendations.     #Generalized OA- bilateral knee worst  -h/o left non-displaced tibial plateau fracture treated non-surgically by Dr. Boucher with injections  -continue tylenol and voltaren gel. If no improvement consider compound cream    #Macular degeneration  -referred to retina specialist by Dr. Pollack on 6/25/19.  "Continue to follow.     #MDD, in remission & anxiety.  Controlled. phq2 score 0. Previously on cymbalta 30 mg daily. Has d/c. Reports mood as good.    #Allergies, controlled   -Continue Singulair 10 mg daily + Cetirizine 10 mg daily + Flonase    #ALBERTO not on cpap  -Following with Dr. Reynoso   -Sleep study 3/26/19: mild ALBERTO. 02 sat down to 80%.  -did have CPAP machine, but had to return due to cost.  We will follow up on this  -hypercarbia noted on cmp- discussed with patient    #Osteoporosis  -9/7/17 DEXA. Repeat ordered.   -continue fosamax    #GERD, controlled  -Protonix 40 mg daily (has been using PRN)- mg 2.    #S/p left ureteral stent placement with urinary incontinence & recurrent "UTIs"  -Follows with Dr. Rosas   -consider whether this is chronic colonization or stone. Discussed abx stewardship and resistance. Attempted to d/c hctz, but she resumed.    #History of saddle embolus requiring TPA in 2010  -continue chronic xarelto  -TTE: 4/10/19-normal EF (50-55%), dense thickening and calcification of the MV annulus, and RVSP of 26 mmHg. No evidence of pulmonary hypertension from this. Follows with  Dr. Tono Talbot     #HTN, controlled  -HCTZ 25 mg daily + K 20 meq daily   Lab Results   Component Value Date    CREATININE 0.8 08/05/2020    BUN 19 08/05/2020     08/05/2020    K 4.0 08/05/2020     08/05/2020    CO2 33 (H) 08/05/2020     #Paroxysmal afib, controlled.   -Toprol XL 25 mg daily + Xarelto 20 mg daily   -follows with dr. Talbot    #Thrombocytopenia  Lab Results   Component Value Date     (L) 08/05/2020   -likely 2/2 NOAC. CTM.    #HLD with statin intolerance & myalgias  -controlled  -Continue Niacin. Can't take statins due to myalgias   -3/29/19: lipid panel: chol 161, tri 71, hdl 52, ldl 94.  Lab Results   Component Value Date    CHOL 172 08/05/2020    TRIG 85 08/05/2020    HDL 59 08/05/2020    LDLCALC 96.0 08/05/2020     #Hypothyroidism   -12/11/18: TSH 0.92.   -Continue Synthroid " 175 mcg daily   Lab Results   Component Value Date    TSH 1.044 08/05/2020     #Reactive airway disease, controlled   -PFT 1/17  -continue arnuity 100 daily  -following with dr. Sandy.     #Morbid obesity- BMI 43.08  -3/29/19: ha1c 5.6% --> 5.2% on 11/14/19  Lab Results   Component Value Date    HGBA1C 5.2 08/05/2020     #Vitamin D insufficiency, resolved  -3/29/19: vit d 16.7, p 3.9, mg 1.9  -started on 1K daily  Lab Results   Component Value Date    HAKWAXNM05OS 33 08/05/2020    YFQRLXNY22EM 43 11/14/2019    KBWVJPNX73PF 10 08/16/2004     #healthcare maintenance  -pneumonia vaccines up-to-date  -flu 9/25/19  -mammogram 10/15/18: birads 2. No further testing.    Follow up in 3 months with dexa on day of appt.    This service was not originating from a related E/M service provided within the previous 7 days nor will  to an E/M service or procedure within the next 24 hours or my soonest available appointment.  Prevailing standard of care was able to be met in this audio-only visit.      Mary Ann Wylie      No orders of the defined types were placed in this encounter.

## 2020-08-31 ENCOUNTER — TELEPHONE (OUTPATIENT)
Dept: FAMILY MEDICINE | Facility: CLINIC | Age: 85
End: 2020-08-31

## 2020-08-31 DIAGNOSIS — M15.9 GENERALIZED OSTEOARTHRITIS: Chronic | ICD-10-CM

## 2020-08-31 DIAGNOSIS — M17.0 PRIMARY OSTEOARTHRITIS OF BOTH KNEES: Chronic | ICD-10-CM

## 2020-08-31 DIAGNOSIS — M80.00XD AGE-RELATED OSTEOPOROSIS WITH CURRENT PATHOLOGICAL FRACTURE WITH ROUTINE HEALING: Chronic | ICD-10-CM

## 2020-08-31 DIAGNOSIS — E78.2 MIXED HYPERLIPIDEMIA: Chronic | ICD-10-CM

## 2020-08-31 DIAGNOSIS — E03.8 OTHER SPECIFIED HYPOTHYROIDISM: Chronic | ICD-10-CM

## 2020-08-31 DIAGNOSIS — I10 ESSENTIAL HYPERTENSION: Primary | Chronic | ICD-10-CM

## 2020-08-31 DIAGNOSIS — I48.0 PAROXYSMAL ATRIAL FIBRILLATION: Chronic | ICD-10-CM

## 2020-08-31 DIAGNOSIS — J45.20 MILD INTERMITTENT REACTIVE AIRWAY DISEASE WITHOUT COMPLICATION: Chronic | ICD-10-CM

## 2020-08-31 DIAGNOSIS — E66.01 MORBID OBESITY WITH BMI OF 40.0-44.9, ADULT: Chronic | ICD-10-CM

## 2020-08-31 DIAGNOSIS — R60.0 BILATERAL EDEMA OF LOWER EXTREMITY: Chronic | ICD-10-CM

## 2020-08-31 DIAGNOSIS — Z79.01 LONG TERM CURRENT USE OF ANTICOAGULANT THERAPY: Chronic | ICD-10-CM

## 2020-08-31 DIAGNOSIS — R53.81 PHYSICAL DECONDITIONING: ICD-10-CM

## 2020-08-31 DIAGNOSIS — F33.42 RECURRENT MAJOR DEPRESSIVE DISORDER, IN FULL REMISSION: Chronic | ICD-10-CM

## 2020-08-31 DIAGNOSIS — F41.9 ANXIETY: Chronic | ICD-10-CM

## 2020-08-31 DIAGNOSIS — M48.02 SPINAL STENOSIS IN CERVICAL REGION: ICD-10-CM

## 2020-08-31 NOTE — TELEPHONE ENCOUNTER
----- Message from Gena Del Rio sent at 8/31/2020 12:31 PM CDT -----  Type:  Needs Medical Advice    Who Called: Patient   Symptoms (please be specific):    How long has patient had these symptoms:    Pharmacy name and phone #:    Would the patient rather a call back or a response via [facility]? Call back   Best Call Back Number: [phone]    Additional Information: please call patient back in regards to the home health nurse

## 2020-08-31 NOTE — TELEPHONE ENCOUNTER
Spoke with Laurie at [facility], she states they need new orders to admit to home health

## 2020-08-31 NOTE — TELEPHONE ENCOUNTER
Patient wants to know if she can resume home health now with [facility], is was put on hold 3/30/20 due to covid 19, please advise

## 2020-09-01 PROCEDURE — G0180 MD CERTIFICATION HHA PATIENT: HCPCS | Mod: ,,, | Performed by: INTERNAL MEDICINE

## 2020-09-01 PROCEDURE — G0180 PR HOME HEALTH MD CERTIFICATION: ICD-10-PCS | Mod: ,,, | Performed by: INTERNAL MEDICINE

## 2020-09-02 DIAGNOSIS — K21.9 GASTROESOPHAGEAL REFLUX DISEASE, ESOPHAGITIS PRESENCE NOT SPECIFIED: ICD-10-CM

## 2020-09-02 DIAGNOSIS — R60.0 BILATERAL EDEMA OF LOWER EXTREMITY: ICD-10-CM

## 2020-09-02 RX ORDER — FUROSEMIDE 20 MG/1
TABLET ORAL
Qty: 90 TABLET | Refills: 3 | OUTPATIENT
Start: 2020-09-02

## 2020-09-02 RX ORDER — PANTOPRAZOLE SODIUM 40 MG/1
TABLET, DELAYED RELEASE ORAL
Qty: 90 TABLET | Refills: 3 | OUTPATIENT
Start: 2020-09-02

## 2020-09-09 ENCOUNTER — EXTERNAL HOME HEALTH (OUTPATIENT)
Dept: HOME HEALTH SERVICES | Facility: HOSPITAL | Age: 85
End: 2020-09-09
Payer: MEDICARE

## 2020-09-14 RX ORDER — LEVOTHYROXINE SODIUM 175 UG/1
175 TABLET ORAL DAILY
Qty: 90 TABLET | Refills: 1 | Status: SHIPPED | OUTPATIENT
Start: 2020-09-14 | End: 2021-04-01

## 2020-09-22 ENCOUNTER — DOCUMENT SCAN (OUTPATIENT)
Dept: HOME HEALTH SERVICES | Facility: HOSPITAL | Age: 85
End: 2020-09-22
Payer: MEDICARE

## 2020-09-25 ENCOUNTER — DOCUMENT SCAN (OUTPATIENT)
Dept: HOME HEALTH SERVICES | Facility: HOSPITAL | Age: 85
End: 2020-09-25
Payer: MEDICARE

## 2020-09-29 RX ORDER — POTASSIUM CHLORIDE 750 MG/1
TABLET, EXTENDED RELEASE ORAL
Qty: 60 TABLET | Refills: 11 | OUTPATIENT
Start: 2020-09-29

## 2020-09-29 NOTE — TELEPHONE ENCOUNTER
Outpatient Medication Detail     Disp Refills Start End ROLA   potassium chloride SA (K-DUR,KLOR-CON) 20 MEQ tablet 90 tablet 1 8/12/2020  No   Sig - Route: Take 1 tablet (20 mEq total) by mouth once daily. - Oral   Sent to pharmacy as: potassium chloride SA (K-DUR,KLOR-CON) 20 MEQ tablet   Class: Normal   Order: [id number]   Date/Time Signed: 8/12/2020 15:02       E-Prescribing Status: Receipt confirmed by pharmacy (8/12/2020  3:03 PM CDT)   Pharmacy    [facility] - [address]          Please get her home health agency to go over every single pill bottle and review whether there are refills. Send correspondence via fax to office.

## 2020-10-01 ENCOUNTER — PATIENT MESSAGE (OUTPATIENT)
Dept: OTHER | Facility: OTHER | Age: 85
End: 2020-10-01

## 2020-10-31 PROCEDURE — G0179 MD RECERTIFICATION HHA PT: HCPCS | Mod: ,,, | Performed by: FAMILY MEDICINE

## 2020-10-31 PROCEDURE — G0179 PR HOME HEALTH MD RECERTIFICATION: ICD-10-PCS | Mod: ,,, | Performed by: FAMILY MEDICINE

## 2020-11-05 ENCOUNTER — TELEPHONE (OUTPATIENT)
Dept: FAMILY MEDICINE | Facility: CLINIC | Age: 85
End: 2020-11-05

## 2020-11-05 NOTE — TELEPHONE ENCOUNTER
----- Message from Madison Mcdaniels sent at 11/5/2020  3:37 PM CST -----  Contact: Pt  Type:  Patient Returning Call    Who Called: pt   Who Left Message for Patient:nurse  Does the patient know what this is regarding?:pt req needs to explain whats going on   Would the patient rather a call back or a response via [facility]?  Phone   Best Call Back Number: [phone]  Additional Information:

## 2020-11-05 NOTE — TELEPHONE ENCOUNTER
----- Message from Komal Preciado sent at 11/5/2020  3:12 PM CST -----  Contact: pt  Type:  Patient Returning Call    Who Called: pt  Who Left Message for Patient: unknown   Does the patient know what this is regarding?: no  Would the patient rather a call back or a response via [facility]? Call back  Best Call Back Number: [phone]  Additional Information: n/a

## 2020-11-27 ENCOUNTER — EXTERNAL HOME HEALTH (OUTPATIENT)
Dept: HOME HEALTH SERVICES | Facility: HOSPITAL | Age: 85
End: 2020-11-27
Payer: MEDICARE

## 2020-12-08 ENCOUNTER — TELEPHONE (OUTPATIENT)
Dept: FAMILY MEDICINE | Facility: CLINIC | Age: 85
End: 2020-12-08

## 2020-12-08 NOTE — TELEPHONE ENCOUNTER
----- Message from Yvonne Luz sent at 12/8/2020  1:52 PM CST -----  Contact: patient  Laura is calling in to check to status on a refill that the pharmacy called in for her for the singular. May you please give her a call back at [phone].    Thanks  KT

## 2020-12-11 ENCOUNTER — PATIENT MESSAGE (OUTPATIENT)
Dept: OTHER | Facility: OTHER | Age: 85
End: 2020-12-11

## 2020-12-16 ENCOUNTER — DOCUMENT SCAN (OUTPATIENT)
Dept: HOME HEALTH SERVICES | Facility: HOSPITAL | Age: 85
End: 2020-12-16
Payer: MEDICARE

## 2020-12-29 ENCOUNTER — DOCUMENT SCAN (OUTPATIENT)
Dept: HOME HEALTH SERVICES | Facility: HOSPITAL | Age: 85
End: 2020-12-29
Payer: MEDICARE

## 2021-01-08 ENCOUNTER — TELEPHONE (OUTPATIENT)
Dept: FAMILY MEDICINE | Facility: CLINIC | Age: 86
End: 2021-01-08

## 2021-01-08 DIAGNOSIS — Z12.31 SCREENING MAMMOGRAM, ENCOUNTER FOR: Primary | ICD-10-CM

## 2021-01-14 ENCOUNTER — OFFICE VISIT (OUTPATIENT)
Dept: FAMILY MEDICINE | Facility: CLINIC | Age: 86
End: 2021-01-14
Payer: MEDICARE

## 2021-01-14 ENCOUNTER — HOSPITAL ENCOUNTER (OUTPATIENT)
Dept: RADIOLOGY | Facility: HOSPITAL | Age: 86
Discharge: HOME | End: 2021-01-14
Attending: INTERNAL MEDICINE
Payer: MEDICARE

## 2021-01-14 VITALS
SYSTOLIC BLOOD PRESSURE: 138 MMHG | HEART RATE: 65 BPM | WEIGHT: 250 LBS | HEIGHT: 64 IN | BODY MASS INDEX: 42.68 KG/M2 | DIASTOLIC BLOOD PRESSURE: 75 MMHG | TEMPERATURE: 99 F | OXYGEN SATURATION: 97 %

## 2021-01-14 DIAGNOSIS — M62.830 LUMBAR PARASPINAL MUSCLE SPASM: ICD-10-CM

## 2021-01-14 DIAGNOSIS — Z13.0 SCREENING FOR DEFICIENCY ANEMIA: ICD-10-CM

## 2021-01-14 DIAGNOSIS — D53.9 MACROCYTIC ANEMIA: ICD-10-CM

## 2021-01-14 DIAGNOSIS — G47.33 OBSTRUCTIVE SLEEP APNEA OF ADULT: Chronic | ICD-10-CM

## 2021-01-14 DIAGNOSIS — Z79.01 LONG TERM CURRENT USE OF ANTICOAGULANT THERAPY: Chronic | ICD-10-CM

## 2021-01-14 DIAGNOSIS — Z13.820 SCREENING FOR OSTEOPOROSIS: ICD-10-CM

## 2021-01-14 DIAGNOSIS — K21.9 GASTROESOPHAGEAL REFLUX DISEASE, UNSPECIFIED WHETHER ESOPHAGITIS PRESENT: Chronic | ICD-10-CM

## 2021-01-14 DIAGNOSIS — M80.00XD AGE-RELATED OSTEOPOROSIS WITH CURRENT PATHOLOGICAL FRACTURE WITH ROUTINE HEALING: ICD-10-CM

## 2021-01-14 DIAGNOSIS — J45.20 MILD INTERMITTENT REACTIVE AIRWAY DISEASE WITHOUT COMPLICATION: Chronic | ICD-10-CM

## 2021-01-14 DIAGNOSIS — R53.81 PHYSICAL DECONDITIONING: ICD-10-CM

## 2021-01-14 DIAGNOSIS — E66.01 MORBID OBESITY WITH BMI OF 40.0-44.9, ADULT: ICD-10-CM

## 2021-01-14 DIAGNOSIS — I48.0 PAROXYSMAL ATRIAL FIBRILLATION: Chronic | ICD-10-CM

## 2021-01-14 DIAGNOSIS — R53.1 GENERALIZED WEAKNESS: ICD-10-CM

## 2021-01-14 DIAGNOSIS — Z86.711 HX PULMONARY EMBOLISM: Chronic | ICD-10-CM

## 2021-01-14 DIAGNOSIS — I10 ESSENTIAL HYPERTENSION: Primary | Chronic | ICD-10-CM

## 2021-01-14 DIAGNOSIS — F33.42 RECURRENT MAJOR DEPRESSIVE DISORDER, IN FULL REMISSION: Chronic | ICD-10-CM

## 2021-01-14 DIAGNOSIS — Z13.21 ENCOUNTER FOR VITAMIN DEFICIENCY SCREENING: ICD-10-CM

## 2021-01-14 DIAGNOSIS — M48.02 SPINAL STENOSIS IN CERVICAL REGION: ICD-10-CM

## 2021-01-14 DIAGNOSIS — E03.8 OTHER SPECIFIED HYPOTHYROIDISM: Chronic | ICD-10-CM

## 2021-01-14 DIAGNOSIS — N39.46 MIXED STRESS AND URGE URINARY INCONTINENCE: Chronic | ICD-10-CM

## 2021-01-14 DIAGNOSIS — M54.41 ACUTE RIGHT-SIDED LOW BACK PAIN WITH RIGHT-SIDED SCIATICA: ICD-10-CM

## 2021-01-14 DIAGNOSIS — M80.00XD AGE-RELATED OSTEOPOROSIS WITH CURRENT PATHOLOGICAL FRACTURE WITH ROUTINE HEALING: Chronic | ICD-10-CM

## 2021-01-14 DIAGNOSIS — D69.6 THROMBOCYTOPENIA: Chronic | ICD-10-CM

## 2021-01-14 DIAGNOSIS — F41.9 ANXIETY: Chronic | ICD-10-CM

## 2021-01-14 DIAGNOSIS — M17.0 PRIMARY OSTEOARTHRITIS OF BOTH KNEES: Chronic | ICD-10-CM

## 2021-01-14 DIAGNOSIS — M15.9 GENERALIZED OSTEOARTHRITIS: Chronic | ICD-10-CM

## 2021-01-14 DIAGNOSIS — E78.2 MIXED HYPERLIPIDEMIA: Chronic | ICD-10-CM

## 2021-01-14 DIAGNOSIS — I27.24 CTEPH (CHRONIC THROMBOEMBOLIC PULMONARY HYPERTENSION): ICD-10-CM

## 2021-01-14 DIAGNOSIS — Z78.0 POSTMENOPAUSAL: ICD-10-CM

## 2021-01-14 PROCEDURE — 99215 OFFICE O/P EST HI 40 MIN: CPT | Mod: PBBFAC,25,PO | Performed by: INTERNAL MEDICINE

## 2021-01-14 PROCEDURE — 77080 DEXA BONE DENSITY SPINE HIP: ICD-10-PCS | Mod: 26,,, | Performed by: RADIOLOGY

## 2021-01-14 PROCEDURE — 77080 DXA BONE DENSITY AXIAL: CPT | Mod: 26,,, | Performed by: RADIOLOGY

## 2021-01-14 PROCEDURE — 99999 PR PBB SHADOW E&M-EST. PATIENT-LVL V: ICD-10-PCS | Mod: PBBFAC,,, | Performed by: INTERNAL MEDICINE

## 2021-01-14 PROCEDURE — 99999 PR PBB SHADOW E&M-EST. PATIENT-LVL V: CPT | Mod: PBBFAC,,, | Performed by: INTERNAL MEDICINE

## 2021-01-14 PROCEDURE — 99214 OFFICE O/P EST MOD 30 MIN: CPT | Mod: S$GLB,,, | Performed by: INTERNAL MEDICINE

## 2021-01-14 PROCEDURE — 77080 DXA BONE DENSITY AXIAL: CPT | Mod: TC,PO

## 2021-01-14 PROCEDURE — 99214 PR OFFICE/OUTPT VISIT, EST, LEVL IV, 30-39 MIN: ICD-10-PCS | Mod: S$GLB,,, | Performed by: INTERNAL MEDICINE

## 2021-01-14 RX ORDER — PANTOPRAZOLE SODIUM 40 MG/1
40 TABLET, DELAYED RELEASE ORAL DAILY
Qty: 90 TABLET | Refills: 1 | Status: SHIPPED | OUTPATIENT
Start: 2021-01-14 | End: 2022-03-11 | Stop reason: SDUPTHER

## 2021-01-14 RX ORDER — CYCLOBENZAPRINE HCL 5 MG
5 TABLET ORAL NIGHTLY PRN
Qty: 30 TABLET | Refills: 0 | Status: SHIPPED | OUTPATIENT
Start: 2021-01-14

## 2021-01-14 RX ORDER — ALENDRONATE SODIUM 70 MG/1
70 TABLET ORAL
Qty: 12 TABLET | Refills: 1 | Status: SHIPPED | OUTPATIENT
Start: 2021-01-14 | End: 2021-06-10 | Stop reason: SDUPTHER

## 2021-01-14 RX ORDER — RIVAROXABAN 20 MG/1
20 TABLET, FILM COATED ORAL DAILY
Qty: 90 TABLET | Refills: 1 | Status: SHIPPED | OUTPATIENT
Start: 2021-01-14 | End: 2021-05-31

## 2021-01-15 ENCOUNTER — TELEPHONE (OUTPATIENT)
Dept: FAMILY MEDICINE | Facility: CLINIC | Age: 86
End: 2021-01-15

## 2021-01-15 PROBLEM — I27.24 CTEPH (CHRONIC THROMBOEMBOLIC PULMONARY HYPERTENSION): Status: ACTIVE | Noted: 2021-01-15

## 2021-01-16 PROCEDURE — G0180 PR HOME HEALTH MD CERTIFICATION: ICD-10-PCS | Mod: ,,, | Performed by: INTERNAL MEDICINE

## 2021-01-16 PROCEDURE — G0180 MD CERTIFICATION HHA PATIENT: HCPCS | Mod: ,,, | Performed by: INTERNAL MEDICINE

## 2021-01-21 DIAGNOSIS — D53.9 MACROCYTIC ANEMIA: ICD-10-CM

## 2021-01-21 DIAGNOSIS — R79.89 LOW VITAMIN B12 LEVEL: Primary | ICD-10-CM

## 2021-01-21 RX ORDER — LANOLIN ALCOHOL/MO/W.PET/CERES
1000 CREAM (GRAM) TOPICAL DAILY
Qty: 90 TABLET | Refills: 1 | Status: SHIPPED | OUTPATIENT
Start: 2021-01-21

## 2021-01-28 ENCOUNTER — TELEPHONE (OUTPATIENT)
Dept: FAMILY MEDICINE | Facility: CLINIC | Age: 86
End: 2021-01-28

## 2021-01-29 ENCOUNTER — TELEPHONE (OUTPATIENT)
Dept: FAMILY MEDICINE | Facility: CLINIC | Age: 86
End: 2021-01-29

## 2021-01-29 DIAGNOSIS — I10 ESSENTIAL HYPERTENSION: Chronic | ICD-10-CM

## 2021-01-29 DIAGNOSIS — R79.89 LOW VITAMIN B12 LEVEL: ICD-10-CM

## 2021-01-29 DIAGNOSIS — D53.9 MACROCYTIC ANEMIA: ICD-10-CM

## 2021-01-29 DIAGNOSIS — Z79.899 LONG TERM CURRENT USE OF DIURETIC: ICD-10-CM

## 2021-01-29 RX ORDER — POTASSIUM CHLORIDE 20 MEQ/1
TABLET, EXTENDED RELEASE ORAL
Qty: 90 TABLET | Refills: 2 | Status: SHIPPED | OUTPATIENT
Start: 2021-01-29 | End: 2022-03-11 | Stop reason: SDUPTHER

## 2021-02-02 ENCOUNTER — EXTERNAL HOME HEALTH (OUTPATIENT)
Dept: HOME HEALTH SERVICES | Facility: HOSPITAL | Age: 86
End: 2021-02-02
Payer: MEDICARE

## 2021-02-26 ENCOUNTER — DOCUMENT SCAN (OUTPATIENT)
Dept: HOME HEALTH SERVICES | Facility: HOSPITAL | Age: 86
End: 2021-02-26
Payer: MEDICARE

## 2021-03-03 ENCOUNTER — DOCUMENT SCAN (OUTPATIENT)
Dept: HOME HEALTH SERVICES | Facility: HOSPITAL | Age: 86
End: 2021-03-03
Payer: MEDICARE

## 2021-03-17 PROCEDURE — G0179 PR HOME HEALTH MD RECERTIFICATION: ICD-10-PCS | Mod: ,,, | Performed by: INTERNAL MEDICINE

## 2021-03-17 PROCEDURE — G0179 MD RECERTIFICATION HHA PT: HCPCS | Mod: ,,, | Performed by: INTERNAL MEDICINE

## 2021-03-19 ENCOUNTER — DOCUMENT SCAN (OUTPATIENT)
Dept: HOME HEALTH SERVICES | Facility: HOSPITAL | Age: 86
End: 2021-03-19
Payer: MEDICARE

## 2021-03-19 ENCOUNTER — EXTERNAL HOME HEALTH (OUTPATIENT)
Dept: HOME HEALTH SERVICES | Facility: HOSPITAL | Age: 86
End: 2021-03-19

## 2021-03-22 ENCOUNTER — DOCUMENT SCAN (OUTPATIENT)
Dept: HOME HEALTH SERVICES | Facility: HOSPITAL | Age: 86
End: 2021-03-22

## 2021-03-22 ENCOUNTER — DOCUMENT SCAN (OUTPATIENT)
Dept: HOME HEALTH SERVICES | Facility: HOSPITAL | Age: 86
End: 2021-03-22
Payer: MEDICARE

## 2021-03-30 ENCOUNTER — PATIENT OUTREACH (OUTPATIENT)
Dept: HOME HEALTH SERVICES | Facility: HOSPITAL | Age: 86
End: 2021-03-30

## 2021-03-31 ENCOUNTER — EXTERNAL HOME HEALTH (OUTPATIENT)
Dept: HOME HEALTH SERVICES | Facility: HOSPITAL | Age: 86
End: 2021-03-31
Payer: MEDICARE

## 2021-04-06 ENCOUNTER — DOCUMENT SCAN (OUTPATIENT)
Dept: HOME HEALTH SERVICES | Facility: HOSPITAL | Age: 86
End: 2021-04-06
Payer: MEDICARE

## 2021-04-29 ENCOUNTER — DOCUMENT SCAN (OUTPATIENT)
Dept: HOME HEALTH SERVICES | Facility: HOSPITAL | Age: 86
End: 2021-04-29
Payer: MEDICARE

## 2021-05-04 ENCOUNTER — DOCUMENT SCAN (OUTPATIENT)
Dept: HOME HEALTH SERVICES | Facility: HOSPITAL | Age: 86
End: 2021-05-04
Payer: MEDICARE

## 2021-05-06 ENCOUNTER — PATIENT MESSAGE (OUTPATIENT)
Dept: RESEARCH | Facility: HOSPITAL | Age: 86
End: 2021-05-06

## 2021-05-16 PROCEDURE — G0179 MD RECERTIFICATION HHA PT: HCPCS | Mod: ,,, | Performed by: INTERNAL MEDICINE

## 2021-05-16 PROCEDURE — G0179 PR HOME HEALTH MD RECERTIFICATION: ICD-10-PCS | Mod: ,,, | Performed by: INTERNAL MEDICINE

## 2021-05-21 ENCOUNTER — DOCUMENT SCAN (OUTPATIENT)
Dept: HOME HEALTH SERVICES | Facility: HOSPITAL | Age: 86
End: 2021-05-21
Payer: MEDICARE

## 2021-06-10 DIAGNOSIS — M80.00XD AGE-RELATED OSTEOPOROSIS WITH CURRENT PATHOLOGICAL FRACTURE WITH ROUTINE HEALING: Chronic | ICD-10-CM

## 2021-06-10 RX ORDER — ALENDRONATE SODIUM 70 MG/1
70 TABLET ORAL
Qty: 12 TABLET | Refills: 1 | Status: SHIPPED | OUTPATIENT
Start: 2021-06-10 | End: 2021-10-22

## 2021-06-14 ENCOUNTER — EXTERNAL HOME HEALTH (OUTPATIENT)
Dept: HOME HEALTH SERVICES | Facility: HOSPITAL | Age: 86
End: 2021-06-14
Payer: MEDICARE

## 2021-07-01 ENCOUNTER — PATIENT MESSAGE (OUTPATIENT)
Dept: ADMINISTRATIVE | Facility: OTHER | Age: 86
End: 2021-07-01

## 2021-08-07 ENCOUNTER — PATIENT MESSAGE (OUTPATIENT)
Dept: FAMILY MEDICINE | Facility: CLINIC | Age: 86
End: 2021-08-07

## 2021-08-09 DIAGNOSIS — I10 ESSENTIAL HYPERTENSION: Chronic | ICD-10-CM

## 2021-08-10 RX ORDER — HYDROCHLOROTHIAZIDE 12.5 MG/1
TABLET ORAL
Qty: 90 TABLET | Refills: 1 | Status: SHIPPED | OUTPATIENT
Start: 2021-08-10 | End: 2022-03-11 | Stop reason: SDUPTHER

## 2021-08-17 ENCOUNTER — PATIENT MESSAGE (OUTPATIENT)
Dept: FAMILY MEDICINE | Facility: CLINIC | Age: 86
End: 2021-08-17

## 2021-08-26 RX ORDER — LEVOTHYROXINE SODIUM 175 UG/1
TABLET ORAL
Qty: 90 TABLET | Refills: 1 | Status: SHIPPED | OUTPATIENT
Start: 2021-08-26 | End: 2022-03-11 | Stop reason: SDUPTHER

## 2021-09-15 DIAGNOSIS — Z86.711 HX PULMONARY EMBOLISM: Chronic | ICD-10-CM

## 2021-09-15 RX ORDER — RIVAROXABAN 20 MG/1
TABLET, FILM COATED ORAL
Qty: 90 TABLET | Refills: 1 | Status: SHIPPED | OUTPATIENT
Start: 2021-09-15 | End: 2022-03-11 | Stop reason: SDUPTHER

## 2021-10-22 DIAGNOSIS — M80.00XD AGE-RELATED OSTEOPOROSIS WITH CURRENT PATHOLOGICAL FRACTURE WITH ROUTINE HEALING: Chronic | ICD-10-CM

## 2021-10-22 RX ORDER — ALENDRONATE SODIUM 70 MG/1
TABLET ORAL
Qty: 12 TABLET | Refills: 4 | Status: SHIPPED | OUTPATIENT
Start: 2021-10-22 | End: 2022-03-11 | Stop reason: SDUPTHER

## 2022-03-08 ENCOUNTER — PATIENT MESSAGE (OUTPATIENT)
Dept: FAMILY MEDICINE | Facility: CLINIC | Age: 87
End: 2022-03-08
Payer: MEDICARE

## 2022-03-11 ENCOUNTER — LAB VISIT (OUTPATIENT)
Dept: LAB | Facility: HOSPITAL | Age: 87
End: 2022-03-11
Attending: FAMILY MEDICINE
Payer: MEDICARE

## 2022-03-11 ENCOUNTER — OFFICE VISIT (OUTPATIENT)
Dept: FAMILY MEDICINE | Facility: CLINIC | Age: 87
End: 2022-03-11
Payer: MEDICARE

## 2022-03-11 VITALS
TEMPERATURE: 97 F | HEART RATE: 72 BPM | SYSTOLIC BLOOD PRESSURE: 107 MMHG | DIASTOLIC BLOOD PRESSURE: 72 MMHG | HEIGHT: 64 IN | WEIGHT: 250.19 LBS | OXYGEN SATURATION: 97 % | BODY MASS INDEX: 42.71 KG/M2

## 2022-03-11 DIAGNOSIS — E03.8 OTHER SPECIFIED HYPOTHYROIDISM: ICD-10-CM

## 2022-03-11 DIAGNOSIS — Z79.899 ENCOUNTER FOR LONG-TERM (CURRENT) USE OF MEDICATIONS: ICD-10-CM

## 2022-03-11 DIAGNOSIS — Z00.00 ENCOUNTER FOR MEDICAL EXAMINATION TO ESTABLISH CARE: ICD-10-CM

## 2022-03-11 DIAGNOSIS — Z86.711 HX PULMONARY EMBOLISM: Chronic | ICD-10-CM

## 2022-03-11 DIAGNOSIS — E66.2 CLASS 3 OBESITY WITH ALVEOLAR HYPOVENTILATION, SERIOUS COMORBIDITY, AND BODY MASS INDEX (BMI) OF 40.0 TO 44.9 IN ADULT: ICD-10-CM

## 2022-03-11 DIAGNOSIS — M80.00XD AGE-RELATED OSTEOPOROSIS WITH CURRENT PATHOLOGICAL FRACTURE WITH ROUTINE HEALING: Chronic | ICD-10-CM

## 2022-03-11 DIAGNOSIS — I27.24 CTEPH (CHRONIC THROMBOEMBOLIC PULMONARY HYPERTENSION): ICD-10-CM

## 2022-03-11 DIAGNOSIS — K21.9 GASTROESOPHAGEAL REFLUX DISEASE, UNSPECIFIED WHETHER ESOPHAGITIS PRESENT: Chronic | ICD-10-CM

## 2022-03-11 DIAGNOSIS — I10 ESSENTIAL HYPERTENSION: ICD-10-CM

## 2022-03-11 DIAGNOSIS — Z79.899 LONG TERM CURRENT USE OF DIURETIC: ICD-10-CM

## 2022-03-11 DIAGNOSIS — R26.81 UNSTEADY GAIT: ICD-10-CM

## 2022-03-11 DIAGNOSIS — D69.6 THROMBOCYTOPENIA: ICD-10-CM

## 2022-03-11 DIAGNOSIS — R53.1 GENERAL WEAKNESS: ICD-10-CM

## 2022-03-11 DIAGNOSIS — F33.42 RECURRENT MAJOR DEPRESSIVE DISORDER, IN FULL REMISSION: ICD-10-CM

## 2022-03-11 DIAGNOSIS — I48.0 PAROXYSMAL ATRIAL FIBRILLATION: ICD-10-CM

## 2022-03-11 DIAGNOSIS — Z00.00 ENCOUNTER FOR MEDICAL EXAMINATION TO ESTABLISH CARE: Primary | ICD-10-CM

## 2022-03-11 LAB
ALBUMIN SERPL BCP-MCNC: 3.7 G/DL (ref 3.5–5.2)
ALP SERPL-CCNC: 73 U/L (ref 55–135)
ALT SERPL W/O P-5'-P-CCNC: 20 U/L (ref 10–44)
ANION GAP SERPL CALC-SCNC: 11 MMOL/L (ref 8–16)
AST SERPL-CCNC: 25 U/L (ref 10–40)
BILIRUB SERPL-MCNC: 0.7 MG/DL (ref 0.1–1)
BUN SERPL-MCNC: 17 MG/DL (ref 8–23)
CALCIUM SERPL-MCNC: 9.6 MG/DL (ref 8.7–10.5)
CHLORIDE SERPL-SCNC: 98 MMOL/L (ref 95–110)
CO2 SERPL-SCNC: 32 MMOL/L (ref 23–29)
CREAT SERPL-MCNC: 0.8 MG/DL (ref 0.5–1.4)
EST. GFR  (AFRICAN AMERICAN): >60 ML/MIN/1.73 M^2
EST. GFR  (NON AFRICAN AMERICAN): >60 ML/MIN/1.73 M^2
ESTIMATED AVG GLUCOSE: 100 MG/DL (ref 68–131)
GLUCOSE SERPL-MCNC: 92 MG/DL (ref 70–110)
HBA1C MFR BLD: 5.1 % (ref 4–5.6)
POTASSIUM SERPL-SCNC: 4.5 MMOL/L (ref 3.5–5.1)
PROT SERPL-MCNC: 7 G/DL (ref 6–8.4)
SODIUM SERPL-SCNC: 141 MMOL/L (ref 136–145)
TSH SERPL DL<=0.005 MIU/L-ACNC: 3.12 UIU/ML (ref 0.4–4)

## 2022-03-11 PROCEDURE — 36415 COLL VENOUS BLD VENIPUNCTURE: CPT | Mod: PO | Performed by: FAMILY MEDICINE

## 2022-03-11 PROCEDURE — 3078F DIAST BP <80 MM HG: CPT | Mod: CPTII,S$GLB,, | Performed by: FAMILY MEDICINE

## 2022-03-11 PROCEDURE — 99999 PR PBB SHADOW E&M-EST. PATIENT-LVL V: CPT | Mod: PBBFAC,,, | Performed by: FAMILY MEDICINE

## 2022-03-11 PROCEDURE — 1159F PR MEDICATION LIST DOCUMENTED IN MEDICAL RECORD: ICD-10-PCS | Mod: CPTII,S$GLB,, | Performed by: FAMILY MEDICINE

## 2022-03-11 PROCEDURE — 3288F FALL RISK ASSESSMENT DOCD: CPT | Mod: CPTII,S$GLB,, | Performed by: FAMILY MEDICINE

## 2022-03-11 PROCEDURE — 1160F RVW MEDS BY RX/DR IN RCRD: CPT | Mod: CPTII,S$GLB,, | Performed by: FAMILY MEDICINE

## 2022-03-11 PROCEDURE — 3078F PR MOST RECENT DIASTOLIC BLOOD PRESSURE < 80 MM HG: ICD-10-PCS | Mod: CPTII,S$GLB,, | Performed by: FAMILY MEDICINE

## 2022-03-11 PROCEDURE — 99215 OFFICE O/P EST HI 40 MIN: CPT | Mod: S$GLB,,, | Performed by: FAMILY MEDICINE

## 2022-03-11 PROCEDURE — 99999 PR PBB SHADOW E&M-EST. PATIENT-LVL V: ICD-10-PCS | Mod: PBBFAC,,, | Performed by: FAMILY MEDICINE

## 2022-03-11 PROCEDURE — 80053 COMPREHEN METABOLIC PANEL: CPT | Performed by: FAMILY MEDICINE

## 2022-03-11 PROCEDURE — 3288F PR FALLS RISK ASSESSMENT DOCUMENTED: ICD-10-PCS | Mod: CPTII,S$GLB,, | Performed by: FAMILY MEDICINE

## 2022-03-11 PROCEDURE — 3074F PR MOST RECENT SYSTOLIC BLOOD PRESSURE < 130 MM HG: ICD-10-PCS | Mod: CPTII,S$GLB,, | Performed by: FAMILY MEDICINE

## 2022-03-11 PROCEDURE — 1160F PR REVIEW ALL MEDS BY PRESCRIBER/CLIN PHARMACIST DOCUMENTED: ICD-10-PCS | Mod: CPTII,S$GLB,, | Performed by: FAMILY MEDICINE

## 2022-03-11 PROCEDURE — 85018 HEMOGLOBIN: CPT | Performed by: FAMILY MEDICINE

## 2022-03-11 PROCEDURE — 83036 HEMOGLOBIN GLYCOSYLATED A1C: CPT | Performed by: FAMILY MEDICINE

## 2022-03-11 PROCEDURE — 1101F PT FALLS ASSESS-DOCD LE1/YR: CPT | Mod: CPTII,S$GLB,, | Performed by: FAMILY MEDICINE

## 2022-03-11 PROCEDURE — 84443 ASSAY THYROID STIM HORMONE: CPT | Performed by: FAMILY MEDICINE

## 2022-03-11 PROCEDURE — 1125F PR PAIN SEVERITY QUANTIFIED, PAIN PRESENT: ICD-10-PCS | Mod: CPTII,S$GLB,, | Performed by: FAMILY MEDICINE

## 2022-03-11 PROCEDURE — 1159F MED LIST DOCD IN RCRD: CPT | Mod: CPTII,S$GLB,, | Performed by: FAMILY MEDICINE

## 2022-03-11 PROCEDURE — 3074F SYST BP LT 130 MM HG: CPT | Mod: CPTII,S$GLB,, | Performed by: FAMILY MEDICINE

## 2022-03-11 PROCEDURE — 1125F AMNT PAIN NOTED PAIN PRSNT: CPT | Mod: CPTII,S$GLB,, | Performed by: FAMILY MEDICINE

## 2022-03-11 PROCEDURE — 99215 PR OFFICE/OUTPT VISIT, EST, LEVL V, 40-54 MIN: ICD-10-PCS | Mod: S$GLB,,, | Performed by: FAMILY MEDICINE

## 2022-03-11 PROCEDURE — 1101F PR PT FALLS ASSESS DOC 0-1 FALLS W/OUT INJ PAST YR: ICD-10-PCS | Mod: CPTII,S$GLB,, | Performed by: FAMILY MEDICINE

## 2022-03-11 RX ORDER — HYDROCHLOROTHIAZIDE 12.5 MG/1
12.5 TABLET ORAL DAILY
Qty: 90 TABLET | Refills: 1 | Status: SHIPPED | OUTPATIENT
Start: 2022-03-11 | End: 2022-03-11 | Stop reason: SDUPTHER

## 2022-03-11 RX ORDER — POTASSIUM CHLORIDE 20 MEQ/1
20 TABLET, EXTENDED RELEASE ORAL DAILY
Qty: 90 TABLET | Refills: 4 | Status: SHIPPED | OUTPATIENT
Start: 2022-03-11 | End: 2023-03-27 | Stop reason: SDUPTHER

## 2022-03-11 RX ORDER — MONTELUKAST SODIUM 10 MG/1
10 TABLET ORAL DAILY
Qty: 90 TABLET | Refills: 4 | Status: SHIPPED | OUTPATIENT
Start: 2022-03-11 | End: 2023-03-27

## 2022-03-11 RX ORDER — ESCITALOPRAM OXALATE 5 MG/1
5 TABLET ORAL DAILY
Qty: 90 TABLET | Refills: 4 | Status: SHIPPED | OUTPATIENT
Start: 2022-03-11 | End: 2022-09-09 | Stop reason: SDUPTHER

## 2022-03-11 RX ORDER — HYDROCHLOROTHIAZIDE 12.5 MG/1
12.5 TABLET ORAL DAILY
Qty: 90 TABLET | Refills: 4 | Status: SHIPPED | OUTPATIENT
Start: 2022-03-11 | End: 2023-03-27 | Stop reason: SDUPTHER

## 2022-03-11 RX ORDER — METOPROLOL SUCCINATE 25 MG/1
25 TABLET, EXTENDED RELEASE ORAL DAILY
Qty: 90 TABLET | Refills: 4 | Status: SHIPPED | OUTPATIENT
Start: 2022-03-11 | End: 2023-03-27

## 2022-03-11 RX ORDER — PANTOPRAZOLE SODIUM 40 MG/1
40 TABLET, DELAYED RELEASE ORAL DAILY
Qty: 90 TABLET | Refills: 4 | Status: SHIPPED | OUTPATIENT
Start: 2022-03-11 | End: 2023-03-27

## 2022-03-11 RX ORDER — LEVOTHYROXINE SODIUM 175 UG/1
175 TABLET ORAL DAILY
Qty: 90 TABLET | Refills: 4 | Status: SHIPPED | OUTPATIENT
Start: 2022-03-11 | End: 2023-03-27 | Stop reason: SDUPTHER

## 2022-03-11 RX ORDER — ALENDRONATE SODIUM 70 MG/1
TABLET ORAL
Qty: 12 TABLET | Refills: 4 | Status: SHIPPED | OUTPATIENT
Start: 2022-03-11 | End: 2023-03-27 | Stop reason: SDUPTHER

## 2022-03-11 NOTE — PROGRESS NOTES
PLAN:      Problem List Items Addressed This Visit     Recurrent major depressive disorder, in full remission (Chronic)     Start low-dose Lexapro.Please be advised of condition course.  SNRI/SSRI is first-line treatment for this condition.  Please be advised of the risk of discontinuing this medication without tapering/contacting MD.  Patient has been advised of side effects, and all questions were answered.  Patient voiced understanding.  Patient will follow up routinely and notify us if having any side effects or worsening or persistent symptoms.  ER precautions were given. Antidepressant/Antianxiety Medication Initiation:  Patient informed of risks, benefits, and potential side effects of medication and accepts informed consent.  Common side effects include nausea, fatigue, headache, insomnia, etc see medication insert for complete side effect profile.  Most importantly be advised of the possibility of new or worsening suicidal thoughts/depression/anxiety etcetera.  Please be advised to stop the medication immediately and seek urgent treatment if this occurs.  Therefore please do not to abruptly discontinue medication without physician guidance except in cases of sudden onset or worsening of SI.              Relevant Medications    EScitalopram oxalate (LEXAPRO) 5 MG Tab    Other Relevant Orders    Ambulatory referral/consult to Home Health    Hx pulmonary embolism (Chronic)     Chronic.  Stable.  Patient is anticoagulation with Xarelto.  No change in HPI.  No new shortness of breath.           Relevant Medications    rivaroxaban (XARELTO) 20 mg Tab    Morbid obesity with BMI of 40.0-44.9, adult (Chronic)     Discussed lifestyle modification with diet and exercise.  Monitoring BMI.           Age-related osteoporosis with current pathological fracture with routine healing (Chronic)     Continue alendronate and vitamin-D calcium.  Work with physical therapy to increased weight-bearing activity at home.  Fall  precautions.           Relevant Medications    alendronate (FOSAMAX) 70 MG tablet    Other specified hypothyroidism (Chronic)     Continue levothyroxine.  Monitoring TSH.Please be advised of hypothyroidism disease course.  We will plan to monitor thyroid labs at routine intervals.  Please be advised of risks and benefits of medication use, see medication insert for complete details.  ER precautions.             Relevant Medications    levothyroxine (SYNTHROID, LEVOTHROID) 175 MCG tablet    Other Relevant Orders    TSH (Completed)    Essential hypertension (Chronic)     Referral to home health for blood pressure monitoring and assistance with meds.  Continue current medication regimen.  Follow-up cardiology.Counseled on importance of hypertension disease course, I recommend ongoing Education for DASH-diet and exercise.  Counseled on medication regimen importance of treating high blood pressure.  Please be advised of risk of untreated blood pressure as discussed.  Please advised of ER precautions were given for symptoms of hypertensive urgency and emergency.             Relevant Medications    hydroCHLOROthiazide (HYDRODIURIL) 12.5 MG Tab    potassium chloride SA (K-DUR,KLOR-CON) 20 MEQ tablet    Other Relevant Orders    Ambulatory referral/consult to Home Health    GERD (gastroesophageal reflux disease) (Chronic)     GERD RECOMMENDATIONS  Please be advised of condition course.  - Take PPI in the morning 30-60 minutes before breakfast  - I recommend ongoing Education for lifestyle modifications to help control/reduce reflux/abdominal pain including: avoid large meals, avoid eating within 2-3 hours of bedtime (avoid late night eating & lying down soon after eating), elevate head of bed if nocturnal symptoms are present, smoking cessation (if current smoker), & weight loss (if overweight).   - please be advised to avoid known foods which trigger reflux symptoms & to minimize/avoid high-fat foods, chocolate, caffeine,  citrus, alcohol, & tomato products.  - Advised to avoid/limit use of NSAID's, since they can cause GI upset, bleeding, and/or ulcers. If needed, take with food.            Relevant Medications    pantoprazole (PROTONIX) 40 MG tablet    Paroxysmal atrial fibrillation (Chronic)     Continue current meds.  Follow-up with Cardiology.  Atrial fibrillation precautions.           Relevant Orders    Ambulatory referral/consult to Home Health    Thrombocytopenia (Chronic)     Stable.  No bleeding reported.  Monitoring.           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  Continue following with specialist.           Relevant Orders    Ambulatory referral/consult to Home Health    Long term current use of diuretic     Complete history and physical was completed today.  Complete and thorough medication reconciliation was performed.  Discussed risks and benefits of medications.  Advised patient on orders and health maintenance.  We discussed old records and old labs if available.  Will request any records not available through epic.  Continue current medications listed on your summary sheet.             Relevant Medications    ascorbic acid,sod/zinc ox,gluc (ZINC AND C ORAL)    hydroCHLOROthiazide (HYDRODIURIL) 12.5 MG Tab    potassium chloride SA (K-DUR,KLOR-CON) 20 MEQ tablet    pantoprazole (PROTONIX) 40 MG tablet    rivaroxaban (XARELTO) 20 mg Tab    alendronate (FOSAMAX) 70 MG tablet    levothyroxine (SYNTHROID, LEVOTHROID) 175 MCG tablet    metoprolol succinate (TOPROL-XL) 25 MG 24 hr tablet    montelukast (SINGULAIR) 10 mg tablet    General weakness     Fall precautions.  Referral to home health for physical therapy.           Relevant Orders    Ambulatory referral/consult to Home Health    Unsteady gait     Continue assistive device at all times.  Fall precautions.  PT OT eval and treat.            Relevant Orders    Ambulatory referral/consult to Home Health      Other Visit Diagnoses     Encounter for medical examination to establish care    -  Primary    Relevant Orders    Ambulatory referral/consult to Home Health    Hemoglobin    Comprehensive Metabolic Panel    Lipid Panel    Hemoglobin A1C    TSH (Completed)        Future Appointments     Date Provider Specialty Appt Notes    9/9/2022 Adithya Valerio MD Family Medicine Annual         Medication Management for assessment above:   Medication List with Changes/Refills   New Medications    ESCITALOPRAM OXALATE (LEXAPRO) 5 MG TAB    Take 1 tablet (5 mg total) by mouth once daily.   Current Medications    ASCORBIC ACID,SOD/ZINC OX,GLUC (ZINC AND C ORAL)    Take by mouth.    CETIRIZINE (ZYRTEC) 10 MG TABLET    Take 1 tablet (10 mg total) by mouth once daily.    CYANOCOBALAMIN (VITAMIN B-12) 1000 MCG TABLET    Take 1 tablet (1,000 mcg total) by mouth once daily.    CYCLOBENZAPRINE (FLEXERIL) 5 MG TABLET    Take 1 tablet (5 mg total) by mouth nightly as needed for Muscle spasms.    DICLOFENAC SODIUM (VOLTAREN) 1 % GEL    Apply 2 g topically once daily. Apply to knees    MULTIVITAMIN (THERAGRAN) PER TABLET    Take 1 tablet by mouth once daily.    NIACIN 500 MG TAB    Take 500 mg by mouth 2 (two) times daily.    VIT C,B-DE-BUXBE-LUTEIN-ZEAXAN (PRESERVISION AREDS 2) [id number] MG-UNIT-MG-MG CAP    Take by mouth.    VITAMIN D (VITAMIN D3) 1000 UNITS TAB    Take 5,000 Units by mouth once daily.   Changed and/or Refilled Medications    Modified Medication Previous Medication    ALENDRONATE (FOSAMAX) 70 MG TABLET alendronate (FOSAMAX) 70 MG tablet       TAKE 1 TABLET BY MOUTH  WEEKLY WITH 8 OZ OF PLAIN  WATER 30 MINUTES BEFORE  FIRST FOOD, DRINK OR MEDS.  STAY UPRIGHT FOR 30 MINS    TAKE 1 TABLET BY MOUTH  WEEKLY WITH 8 OZ OF PLAIN  WATER 30 MINUTES BEFORE  FIRST FOOD, DRINK OR MEDS.  STAY UPRIGHT FOR 30 MINS    HYDROCHLOROTHIAZIDE (HYDRODIURIL) 12.5 MG TAB  hydroCHLOROthiazide (HYDRODIURIL) 12.5 MG Tab       Take 1 tablet (12.5 mg total) by mouth once daily.    TAKE 1 TABLET BY MOUTH ONCE DAILY    LEVOTHYROXINE (SYNTHROID, LEVOTHROID) 175 MCG TABLET levothyroxine (SYNTHROID, LEVOTHROID) 175 MCG tablet       Take 1 tablet (175 mcg total) by mouth once daily.    TAKE 1 TABLET BY MOUTH ONCE DAILY    METOPROLOL SUCCINATE (TOPROL-XL) 25 MG 24 HR TABLET metoprolol succinate (TOPROL-XL) 25 MG 24 hr tablet       Take 1 tablet (25 mg total) by mouth once daily.    Take 1 tablet (25 mg total) by mouth once daily.    MONTELUKAST (SINGULAIR) 10 MG TABLET montelukast (SINGULAIR) 10 mg tablet       Take 1 tablet (10 mg total) by mouth once daily.    TAKE 1 TABLET BY MOUTH ONCE DAILY    PANTOPRAZOLE (PROTONIX) 40 MG TABLET pantoprazole (PROTONIX) 40 MG tablet       Take 1 tablet (40 mg total) by mouth once daily.    Take 1 tablet (40 mg total) by mouth once daily.    POTASSIUM CHLORIDE SA (K-DUR,KLOR-CON) 20 MEQ TABLET potassium chloride SA (K-DUR,KLOR-CON) 20 MEQ tablet       Take 1 tablet (20 mEq total) by mouth once daily.    TAKE 1 TABLET BY MOUTH ONCE DAILY    RIVAROXABAN (XARELTO) 20 MG TAB XARELTO 20 mg Tab       Take 1 tablet (20 mg total) by mouth once daily.    TAKE 1 TABLET BY MOUTH ONCE DAILY       Adithya Valerio M.D.  ==========================================================================  Subjective:   Patient ID: Laura Yu is a 87 y.o. female.  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   Chief Complaint: Establish Care      Problem List Items Addressed This Visit     Recurrent major depressive disorder, in full remission (Chronic)    Overview     Chronic.  Recurrent.  Currently having some depressed mood due to not being able to get out participate in activities with her [employer] daughter association.  Patient denies any SI HI hallucinations.  She is agreeable to starting medication.           Current Assessment & Plan     Start low-dose Lexapro.Please be advised of condition course.  SNRI/SSRI is first-line treatment for this condition.  Please be advised of the risk of discontinuing this medication without tapering/contacting MD.  Patient has been advised of side effects, and all questions were answered.  Patient voiced understanding.  Patient will follow up routinely and notify us if having any side effects or worsening or persistent symptoms.  ER precautions were given. Antidepressant/Antianxiety Medication Initiation:  Patient informed of risks, benefits, and potential side effects of medication and accepts informed consent.  Common side effects include nausea, fatigue, headache, insomnia, etc see medication insert for complete side effect profile.  Most importantly be advised of the possibility of new or worsening suicidal thoughts/depression/anxiety etcetera.  Please be advised to stop the medication immediately and seek urgent treatment if this occurs.  Therefore please do not to abruptly discontinue medication without physician guidance except in cases of sudden onset or worsening of SI.              Hx pulmonary embolism (Chronic)    Overview     Had  saddle embolism---2010  S/p IVC filter           Current Assessment & Plan     Chronic.  Stable.  Patient is anticoagulation with Xarelto.  No change in HPI.  No new shortness of breath.           Morbid obesity with BMI of 40.0-44.9, adult (Chronic)    Overview     BMI Readings from Last 10 Encounters:   03/11/22 42.95 kg/m²   01/14/21 42.91 kg/m²   01/31/20 43.08 kg/m²   01/10/20 42.55 kg/m²   01/10/20 42.57 kg/m²   11/14/19 43.08 kg/m²                Current Assessment & Plan     Discussed lifestyle modification with diet and exercise.  Monitoring BMI.           Age-related osteoporosis with current pathological fracture with routine healing (Chronic)    Overview     Chronic.  Control is uncertain.  Patient is Fosamax.  Also takes calcium and vitamin-D.    Chronic. Vit d deficiency. Takes vitamin d supplement. No SE reported.   Lab Results   Component Value Date    [id number] 34 01/14/2021    [id number] 33 08/05/2020    [id number] 43 11/14/2019      DXA Bone Density Spine And Hip  Narrative: EXAMINATION:  DEXA BONE DENSITY SPINE HIP    CLINICAL HISTORY:  Age-related osteoporosis with current pathological fracture, unspecified site, subsequent encounter for fracture with routine healing    TECHNIQUE:  DXA scanning was performed over the right hip and lumbar spine.  Review of the images confirms satisfactory positioning and technique.    COMPARISON:  None    FINDINGS:  The L1 to L4 vertebral bone mineral density is equal to 1.078 g/cm squared with a T score of -0.8.    The right femoral neck bone mineral density is equal to 0.616 g/cm squared with a T score of -3.0.    The total hip bone mineral density is equal to 0.633 g/cm squared with a T score of -3.0.  Impression: Osteoporosis    Consider FDA approved medical therapies in postmenopausal women and men aged 50 years and older, based on the following:    *A hip or vertebral (clinical or morphometric) fracture  *T score less than or equal to -2.5 at the  femoral neck or spine after appropriate evaluation to exclude secondary causes.  *Low bone mass -- also known as osteopenia (T score between -1.0 and -2.5 at the femoral neck or spine) and a 10 year probability of hip fracture greater than or equal to 3% or a 10 year probability of major osteoporosis-related fracture greater than or equal to 20% based on the [place]-adapted WHO algorithm.  *Clinicians judgment and/or patient preference may indicate treatment for people with 10 year fracture probabilities is above or below these levels.    Electronically signed by: Darek Pulliam DO  Date:    01/14/2021  Time:    12:55                  Current Assessment & Plan     Continue alendronate and vitamin-D calcium.  Work with physical therapy to increased weight-bearing activity at home.  Fall precautions.           Other specified hypothyroidism (Chronic)    Overview     Chronic.  Stable.  Patient is on levothyroxine 175 micrograms.  Lab Results   Component Value Date    TSH 3.116 03/11/2022    FREET4 1.07 07/09/2008                Current Assessment & Plan     Continue levothyroxine.  Monitoring TSH.Please be advised of hypothyroidism disease course.  We will plan to monitor thyroid labs at routine intervals.  Please be advised of risks and benefits of medication use, see medication insert for complete details.  ER precautions.             Essential hypertension (Chronic)    Overview     CHRONIC. STABLE. BP Reviewed.  Compliant with BP medications. No SE reported.   (-) CP, SOB, palpitations, dizziness, lightheadedness, HA, arm numbness, tingling or weakness, syncope.  Creatinine   Date Value Ref Range Status   03/11/2022 0.8 0.5 - 1.4 mg/dL Final                Current Assessment & Plan     Referral to home health for blood pressure monitoring and assistance with meds.  Continue current medication regimen.  Follow-up cardiology.Counseled on importance of hypertension disease course, I recommend ongoing Education for DASH-diet  and exercise.  Counseled on medication regimen importance of treating high blood pressure.  Please be advised of risk of untreated blood pressure as discussed.  Please advised of ER precautions were given for symptoms of hypertensive urgency and emergency.             GERD (gastroesophageal reflux disease) (Chronic)    Overview     Chronic.  Stable.  Patient is taking pantoprazole daily.  Reports compliance.  No side effects reported.  Symptoms are controlled.           Current Assessment & Plan     GERD RECOMMENDATIONS  Please be advised of condition course.  - Take PPI in the morning 30-60 minutes before breakfast  - I recommend ongoing Education for lifestyle modifications to help control/reduce reflux/abdominal pain including: avoid large meals, avoid eating within 2-3 hours of bedtime (avoid late night eating & lying down soon after eating), elevate head of bed if nocturnal symptoms are present, smoking cessation (if current smoker), & weight loss (if overweight).   - please be advised to avoid known foods which trigger reflux symptoms & to minimize/avoid high-fat foods, chocolate, caffeine, citrus, alcohol, & tomato products.  - Advised to avoid/limit use of NSAID's, since they can cause GI upset, bleeding, and/or ulcers. If needed, take with food.            Paroxysmal atrial fibrillation (Chronic)    Overview     Chronic.  Stable.  Patient is metoprolol for rate control and Xarelto for anticoagulation.  Patient follows with Cardiology.           Current Assessment & Plan     Continue current meds.  Follow-up with Cardiology.  Atrial fibrillation precautions.           Thrombocytopenia (Chronic)    Overview     Chronic.  Stable.  Lab Results   Component Value Date    WBC 5.84 01/14/2021    HGB 12.1 03/11/2022    HCT 39.4 01/14/2021     (H) 01/14/2021     (L) 01/14/2021                  Current Assessment & Plan     Stable.  No bleeding reported.  Monitoring.           CTEPH (chronic  thromboembolic pulmonary hypertension) (Chronic)    Overview     Chronic.  Stable.  Patient follows with Cardiology and Pulmonary.           Current Assessment & Plan     Continue current medication regimen.  Continue following with specialist.           Long term current use of diuretic    Overview     CHRONIC. Stable. Compliant with medications for managed conditions. See medication list. No SE reported.   Routine lab analysis is being monitored. Refills were addressed.  Lab Results   Component Value Date    WBC 5.84 01/14/2021    HGB 12.1 03/11/2022    HCT 39.4 01/14/2021     (H) 01/14/2021     (L) 01/14/2021         Chemistry        Component Value Date/Time     03/11/2022 1533    K 4.5 03/11/2022 1533    CL 98 03/11/2022 1533    CO2 32 (H) 03/11/2022 1533    BUN 17 03/11/2022 1533    CREATININE 0.8 03/11/2022 1533    GLU 92 03/11/2022 1533        Component Value Date/Time    CALCIUM 9.6 03/11/2022 1533    ALKPHOS 73 03/11/2022 1533    AST 25 03/11/2022 1533    ALT 20 03/11/2022 1533    BILITOT 0.7 03/11/2022 1533    ESTGFRAFRICA >60.0 03/11/2022 1533    EGFRNONAA >60.0 03/11/2022 1533          Lab Results   Component Value Date    TSH 3.116 03/11/2022    FREET4 1.07 07/09/2008                Current Assessment & Plan     Complete history and physical was completed today.  Complete and thorough medication reconciliation was performed.  Discussed risks and benefits of medications.  Advised patient on orders and health maintenance.  We discussed old records and old labs if available.  Will request any records not available through epic.  Continue current medications listed on your summary sheet.             General weakness    Overview     Chronic.  Worsening.  Patient reports that she has been more inactive lately due to being depressed in staying at home due to COVID.  She is using a walker today.           Current Assessment & Plan     Fall precautions.  Referral to home health for physical  therapy.           Unsteady gait    Overview     Uses walker.           Current Assessment & Plan     Continue assistive device at all times.  Fall precautions.  PT OT eval and treat.             Other Visit Diagnoses     Encounter for medical examination to establish care    -  Primary           Review of patient's allergies indicates:   Allergen Reactions    Hydrocodone-acetaminophen     Iodine and iodide containing products     Meperidine     Morphine     Penicillins     Sulfa (sulfonamide antibiotics)     Macrobid [nitrofurantoin monohyd/m-cryst] Nausea And Vomiting     Current Outpatient Medications   Medication Instructions    alendronate (FOSAMAX) 70 MG tablet TAKE 1 TABLET BY MOUTH  WEEKLY WITH 8 OZ OF PLAIN  WATER 30 MINUTES BEFORE  FIRST FOOD, DRINK OR MEDS.  STAY UPRIGHT FOR 30 MINS    ascorbic acid,sod/zinc ox,gluc (ZINC AND C ORAL) Oral    cetirizine (ZYRTEC) 10 mg, Oral, Daily    cyanocobalamin (VITAMIN B-12) 1,000 mcg, Oral, Daily    cyclobenzaprine (FLEXERIL) 5 mg, Oral, Nightly PRN    diclofenac sodium (VOLTAREN) 2 g, Topical (Top), Daily, Apply to knees    EScitalopram oxalate (LEXAPRO) 5 mg, Oral, Daily    hydroCHLOROthiazide (HYDRODIURIL) 12.5 mg, Oral, Daily    levothyroxine (SYNTHROID, LEVOTHROID) 175 mcg, Oral, Daily    metoprolol succinate (TOPROL-XL) 25 mg, Oral, Daily    montelukast (SINGULAIR) 10 mg, Oral, Daily    multivitamin (THERAGRAN) per tablet 1 tablet, Oral, Daily    niacin 500 mg, Oral, 2 times daily    pantoprazole (PROTONIX) 40 mg, Oral, Daily    potassium chloride SA (K-DUR,KLOR-CON) 20 MEQ tablet 20 mEq, Oral, Daily    rivaroxaban (XARELTO) 20 mg, Oral, Daily    vit C,L-Ao-jigud-lutein-zeaxan (PRESERVISION AREDS 2) 146-315-51-1 mg-unit-mg-mg Cap Oral    vitamin D (VITAMIN D3) 5,000 Units, Oral, Daily      I have reviewed the PMH, social history, FamilyHx, surgical history, allergies and medications documented / confirmed by the patient at the time of  "this visit.  Review of Systems   Constitutional: Positive for activity change (less mobile due to back pain and weakness) and fatigue. Negative for chills, fever and unexpected weight change.   HENT: Negative.    Eyes: Negative.    Respiratory: Negative for cough and shortness of breath.    Cardiovascular: Positive for leg swelling (chronic). Negative for chest pain and palpitations.   Gastrointestinal: Negative for abdominal pain and diarrhea.   Endocrine: Negative.    Genitourinary: Negative for dysuria and hematuria.   Musculoskeletal: Positive for arthralgias, back pain, gait problem and myalgias.   Skin: Negative.    Allergic/Immunologic: Negative.    Neurological: Positive for weakness (generalized) and numbness (sciatica).   Hematological: Negative for adenopathy. Does not bruise/bleed easily.   Psychiatric/Behavioral: Positive for decreased concentration and dysphoric mood.     Objective:   /72   Pulse 72   Temp 97 °F (36.1 °C) (Other (see comments))   Ht 5' 4" (1.626 m)   Wt 113.5 kg (250 lb 3.2 oz)   SpO2 97%   BMI 42.95 kg/m²   Physical Exam  Vitals and nursing note reviewed.   Constitutional:       General: She is not in acute distress.     Appearance: She is well-developed. She is not ill-appearing, toxic-appearing or diaphoretic.      Comments: Here with daughter.  using rolling walker.   HENT:      Head: Normocephalic and atraumatic.      Right Ear: Hearing and external ear normal.      Left Ear: Hearing and external ear normal.   Eyes:      General: Lids are normal.      Conjunctiva/sclera: Conjunctivae normal.   Cardiovascular:      Rate and Rhythm: Normal rate and regular rhythm.      Heart sounds: Normal heart sounds. No murmur heard.  Pulmonary:      Effort: Pulmonary effort is normal. No respiratory distress.      Breath sounds: Normal breath sounds.   Abdominal:      General: Bowel sounds are normal.      Palpations: Abdomen is soft.   Musculoskeletal:         General: Tenderness " and deformity present. Normal range of motion.      Cervical back: Normal range of motion.      Right lower leg: Edema present.      Left lower leg: Edema present.   Lymphadenopathy:      Cervical: No cervical adenopathy.   Skin:     General: Skin is warm and dry.      Capillary Refill: Capillary refill takes less than 2 seconds.      Coloration: Skin is not pale.   Neurological:      Mental Status: She is alert and oriented to person, place, and time. She is not disoriented.      Sensory: No sensory deficit.   Psychiatric:         Attention and Perception: She is attentive. She does not perceive auditory or visual hallucinations.         Mood and Affect: Mood is depressed. Mood is not anxious.         Speech: Speech is not rapid and pressured or slurred.         Behavior: Behavior normal. Behavior is not agitated, aggressive or hyperactive. Behavior is cooperative.         Thought Content: Thought content normal. Thought content is not paranoid or delusional. Thought content does not include homicidal or suicidal ideation. Thought content does not include homicidal or suicidal plan.         Cognition and Memory: Memory is impaired.         Judgment: Judgment normal.        Patient is wearing a mask which limits physical exam due to COVID restrictions.   Assessment:     1. Encounter for medical examination to establish care    2. Encounter for long-term (current) use of medications    3. Essential hypertension    4. Recurrent major depressive disorder, in full remission    5. Class 3 obesity with alveolar hypoventilation, serious comorbidity, and body mass index (BMI) of 40.0 to 44.9 in adult    6. Thrombocytopenia    7. CTEPH (chronic thromboembolic pulmonary hypertension)    8. Paroxysmal atrial fibrillation    9. General weakness    10. Unsteady gait    11. Hx pulmonary embolism    12. Other specified hypothyroidism    13. Long term current use of diuretic    14. Gastroesophageal reflux disease, unspecified  whether esophagitis present    15. Age-related osteoporosis with current pathological fracture with routine healing      MDM:   High medical complexity.  Moderate to high risk.  Total time: 54 minutes.  This includes total time spent on the encounter, which includes face to face time and non-face to face time preparing to see the patient (eg, review of previous medical records, tests), Obtaining and/or reviewing separately obtained history, documenting clinical information in the electronic or other health record, independently interpreting results (not separately reported)/communicating results to the patient/family/caregiver, and/or care coordination (not separately reported).    I have Reviewed and summarized old records.  I have performed thorough medication reconciliation today and discussed risk and benefits of medications.  I have reviewed labs and discussed with patient.  All questions were answered.  I am requesting old records and will review them once they are available.prev pcp: Dr. hawkins   Home Health: Martín Balbuena MD : Cardiology   Jose Rosas MD : Urology  pulmonology: Hernandez Talbot MD [facility] Marko Brown MD : Optometry      I have signed for the following orders AND/OR meds.  Orders Placed This Encounter   Procedures    Hemoglobin     Standing Status:   Standing     Number of Occurrences:   99     Standing Expiration Date:   4/6/2041    Comprehensive Metabolic Panel     Standing Status:   Standing     Number of Occurrences:   99     Standing Expiration Date:   4/6/2041    Lipid Panel     Standing Status:   Standing     Number of Occurrences:   99     Standing Expiration Date:   4/6/2041    Hemoglobin A1C     Standing Status:   Standing     Number of Occurrences:   99     Standing Expiration Date:   4/6/2041    TSH     Standing Status:   Future     Number of Occurrences:   1     Standing Expiration Date:   5/10/2023    Ambulatory referral/consult to [facility]      Standing Status:   Future     Standing Expiration Date:   4/11/2023     Referral Priority:   Routine     Referral Type:   Home Health     Referral Reason:   Specialty Services Required     Requested Specialty:   Home Health Services     Number of Visits Requested:   1     Medications Ordered This Encounter   Medications    alendronate (FOSAMAX) 70 MG tablet     Sig: TAKE 1 TABLET BY MOUTH  WEEKLY WITH 8 OZ OF PLAIN  WATER 30 MINUTES BEFORE  FIRST FOOD, DRINK OR MEDS.  STAY UPRIGHT FOR 30 MINS     Dispense:  12 tablet     Refill:  4     Requesting 1 year supply    EScitalopram oxalate (LEXAPRO) 5 MG Tab     Sig: Take 1 tablet (5 mg total) by mouth once daily.     Dispense:  90 tablet     Refill:  4    hydroCHLOROthiazide (HYDRODIURIL) 12.5 MG Tab     Sig: Take 1 tablet (12.5 mg total) by mouth once daily.     Dispense:  90 tablet     Refill:  4     Requesting 1 year supply    levothyroxine (SYNTHROID, LEVOTHROID) 175 MCG tablet     Sig: Take 1 tablet (175 mcg total) by mouth once daily.     Dispense:  90 tablet     Refill:  4     Requesting 1 year supply    metoprolol succinate (TOPROL-XL) 25 MG 24 hr tablet     Sig: Take 1 tablet (25 mg total) by mouth once daily.     Dispense:  90 tablet     Refill:  4     .    montelukast (SINGULAIR) 10 mg tablet     Sig: Take 1 tablet (10 mg total) by mouth once daily.     Dispense:  90 tablet     Refill:  4     Requesting 1 year supply    pantoprazole (PROTONIX) 40 MG tablet     Sig: Take 1 tablet (40 mg total) by mouth once daily.     Dispense:  90 tablet     Refill:  4    potassium chloride SA (K-DUR,KLOR-CON) 20 MEQ tablet     Sig: Take 1 tablet (20 mEq total) by mouth once daily.     Dispense:  90 tablet     Refill:  4    rivaroxaban (XARELTO) 20 mg Tab     Sig: Take 1 tablet (20 mg total) by mouth once daily.     Dispense:  90 tablet     Refill:  4     Requesting 1 year supply        Follow up in about 6 months (around 9/11/2022), or if symptoms worsen or fail to  improve, for Annual Wellness Exam.    If no improvement in symptoms or symptoms worsen, advised to call/follow-up at clinic or go to ER. Patient voiced understanding and all questions/concerns were addressed.   DISCLAIMER: This note was compiled by using a speech recognition dictation system and therefore please be aware that typographical / speech recognition errors can and do occur.  Please contact me if you see any errors specifically.    Adithya Valerio M.D.       Office: [phone] [address] [place] [address]  FAX: [phone]

## 2022-03-11 NOTE — PATIENT INSTRUCTIONS
Follow up in about 6 months (around 9/11/2022), or if symptoms worsen or fail to improve, for Annual Wellness Exam.     Dear patient,   As a result of recent federal legislation (The Federal Cures Act), you may receive lab or pathology results from your visit in your [facility] account before your physician is able to contact you. Your physician or their representative will relay the results to you with their recommendations at their soonest availability.     If no improvement in symptoms or symptoms worsen, please be advised to call MD, follow-up at clinic and/or go to ER if becomes severe.    Adithya Valerio M.D.        We Offer TELEHEALTH & Same Day Appointments!   Book your Telehealth appointment with me through my nurse or   Clinic appointments on [employer]!    [address] [place] [address]    Office: [phone]   FAX: [phone]    Check out my Facebook Page and Follow Me at: [email or web address]    Check out my website at [employer] by clicking on: [email or web address]    To Schedule appointments online, go to [employer]: [email or web address]

## 2022-03-12 ENCOUNTER — PATIENT MESSAGE (OUTPATIENT)
Dept: FAMILY MEDICINE | Facility: CLINIC | Age: 87
End: 2022-03-12
Payer: MEDICARE

## 2022-03-12 DIAGNOSIS — F33.42 RECURRENT MAJOR DEPRESSIVE DISORDER, IN FULL REMISSION: ICD-10-CM

## 2022-03-12 DIAGNOSIS — I48.0 PAROXYSMAL ATRIAL FIBRILLATION: ICD-10-CM

## 2022-03-12 DIAGNOSIS — R26.81 UNSTEADY GAIT: Primary | ICD-10-CM

## 2022-03-12 DIAGNOSIS — R53.1 GENERAL WEAKNESS: ICD-10-CM

## 2022-03-12 DIAGNOSIS — Z79.899 LONG TERM CURRENT USE OF DIURETIC: ICD-10-CM

## 2022-03-12 DIAGNOSIS — D69.6 THROMBOCYTOPENIA: ICD-10-CM

## 2022-03-12 DIAGNOSIS — I27.24 CTEPH (CHRONIC THROMBOEMBOLIC PULMONARY HYPERTENSION): ICD-10-CM

## 2022-03-12 LAB — HGB BLD-MCNC: 12.1 G/DL (ref 12–16)

## 2022-03-13 NOTE — PROGRESS NOTES
Make follow-up lab appointment per recommendation below.  Check to see if patient has seen the results through my chart.  If not then,  #CALL THE PATIENT# to discuss results/see if they have questions and document verification of contact. Make F/U appt if needed. [phone]    #My interpretation that was sent to them through [employer]:  Laura, I have reviewed your recent blood work.     Your hemoglobin is normal, no anemia  Your metabolic panel which shows your glucose, kidney function, electrolytes, and liver function is normal.   Thyroid study is normal on current dosage of levothyroxine.   Your hemoglobin A1c is normal, no diabetes.  This test is gold standard screening test for diabetes.  It is a measures 3 months of your average blood sugar.    Continue current medication regimen.  Repeat annual wellness labs in one year.  =========================  Also please address any outstanding health maintenance that may be due: There are no preventive care reminders to display for this patient.

## 2022-03-13 NOTE — ASSESSMENT & PLAN NOTE
Continue levothyroxine.  Monitoring TSH.Please be advised of hypothyroidism disease course.  We will plan to monitor thyroid labs at routine intervals.  Please be advised of risks and benefits of medication use, see medication insert for complete details.  ER precautions.

## 2022-03-13 NOTE — ASSESSMENT & PLAN NOTE
Continue alendronate and vitamin-D calcium.  Work with physical therapy to increased weight-bearing activity at home.  Fall precautions.

## 2022-03-13 NOTE — ASSESSMENT & PLAN NOTE
GERD RECOMMENDATIONS  Please be advised of condition course.  - Take PPI in the morning 30-60 minutes before breakfast  - I recommend ongoing Education for lifestyle modifications to help control/reduce reflux/abdominal pain including: avoid large meals, avoid eating within 2-3 hours of bedtime (avoid late night eating & lying down soon after eating), elevate head of bed if nocturnal symptoms are present, smoking cessation (if current smoker), & weight loss (if overweight).   - please be advised to avoid known foods which trigger reflux symptoms & to minimize/avoid high-fat foods, chocolate, caffeine, citrus, alcohol, & tomato products.  - Advised to avoid/limit use of NSAID's, since they can cause GI upset, bleeding, and/or ulcers. If needed, take with food.

## 2022-03-13 NOTE — ASSESSMENT & PLAN NOTE
Referral to home health for blood pressure monitoring and assistance with meds.  Continue current medication regimen.  Follow-up cardiology.Counseled on importance of hypertension disease course, I recommend ongoing Education for DASH-diet and exercise.  Counseled on medication regimen importance of treating high blood pressure.  Please be advised of risk of untreated blood pressure as discussed.  Please advised of ER precautions were given for symptoms of hypertensive urgency and emergency.

## 2022-03-13 NOTE — ASSESSMENT & PLAN NOTE
Chronic.  Stable.  Patient is anticoagulation with Xarelto.  No change in HPI.  No new shortness of breath.

## 2022-03-14 NOTE — TELEPHONE ENCOUNTER
I have signed for the following orders AND/OR meds.  Please call the patient and ask the patient to schedule the testing AND/OR inform about any medications that were sent.      Orders Placed This Encounter   Procedures    Ambulatory referral/consult to Home Health     Standing Status:   Future     Standing Expiration Date:   4/14/2023     Referral Priority:   Routine     Referral Type:   Home Health     Referral Reason:   Specialty Services Required     Referred to Provider:   [facility]     Requested Specialty:   Home Health Services     Number of Visits Requested:   1

## 2022-03-30 ENCOUNTER — DOCUMENT SCAN (OUTPATIENT)
Dept: HOME HEALTH SERVICES | Facility: HOSPITAL | Age: 87
End: 2022-03-30
Payer: MEDICARE

## 2022-04-10 PROCEDURE — G0179 PR HOME HEALTH MD RECERTIFICATION: ICD-10-PCS | Mod: ,,, | Performed by: FAMILY MEDICINE

## 2022-04-10 PROCEDURE — G0179 MD RECERTIFICATION HHA PT: HCPCS | Mod: ,,, | Performed by: FAMILY MEDICINE

## 2022-04-19 ENCOUNTER — EXTERNAL HOME HEALTH (OUTPATIENT)
Dept: HOME HEALTH SERVICES | Facility: HOSPITAL | Age: 87
End: 2022-04-19
Payer: MEDICARE

## 2022-04-28 ENCOUNTER — DOCUMENT SCAN (OUTPATIENT)
Dept: HOME HEALTH SERVICES | Facility: HOSPITAL | Age: 87
End: 2022-04-28
Payer: MEDICARE

## 2022-05-02 ENCOUNTER — DOCUMENT SCAN (OUTPATIENT)
Dept: HOME HEALTH SERVICES | Facility: HOSPITAL | Age: 87
End: 2022-05-02
Payer: MEDICARE

## 2022-05-31 ENCOUNTER — PATIENT MESSAGE (OUTPATIENT)
Dept: FAMILY MEDICINE | Facility: CLINIC | Age: 87
End: 2022-05-31
Payer: MEDICARE

## 2022-06-07 ENCOUNTER — DOCUMENT SCAN (OUTPATIENT)
Dept: HOME HEALTH SERVICES | Facility: HOSPITAL | Age: 87
End: 2022-06-07
Payer: MEDICARE

## 2022-06-09 PROCEDURE — G0179 PR HOME HEALTH MD RECERTIFICATION: ICD-10-PCS | Mod: ,,, | Performed by: FAMILY MEDICINE

## 2022-06-09 PROCEDURE — G0179 MD RECERTIFICATION HHA PT: HCPCS | Mod: ,,, | Performed by: FAMILY MEDICINE

## 2022-07-08 ENCOUNTER — DOCUMENT SCAN (OUTPATIENT)
Dept: HOME HEALTH SERVICES | Facility: HOSPITAL | Age: 87
End: 2022-07-08
Payer: MEDICARE

## 2022-07-08 ENCOUNTER — EXTERNAL HOME HEALTH (OUTPATIENT)
Dept: HOME HEALTH SERVICES | Facility: HOSPITAL | Age: 87
End: 2022-07-08
Payer: MEDICARE

## 2022-07-11 ENCOUNTER — DOCUMENT SCAN (OUTPATIENT)
Dept: HOME HEALTH SERVICES | Facility: HOSPITAL | Age: 87
End: 2022-07-11
Payer: MEDICARE

## 2022-07-29 ENCOUNTER — DOCUMENT SCAN (OUTPATIENT)
Dept: HOME HEALTH SERVICES | Facility: HOSPITAL | Age: 87
End: 2022-07-29
Payer: MEDICARE

## 2022-08-07 ENCOUNTER — DOCUMENT SCAN (OUTPATIENT)
Dept: HOME HEALTH SERVICES | Facility: HOSPITAL | Age: 87
End: 2022-08-07
Payer: MEDICARE

## 2022-08-10 ENCOUNTER — DOCUMENT SCAN (OUTPATIENT)
Dept: HOME HEALTH SERVICES | Facility: HOSPITAL | Age: 87
End: 2022-08-10
Payer: MEDICARE

## 2022-08-17 ENCOUNTER — PATIENT MESSAGE (OUTPATIENT)
Dept: FAMILY MEDICINE | Facility: CLINIC | Age: 87
End: 2022-08-17
Payer: MEDICARE

## 2022-08-17 NOTE — TELEPHONE ENCOUNTER
Spoke with patient's daughter and assisted with scheduling a virtual visit for further evaluation of her symptoms.

## 2022-08-18 ENCOUNTER — DOCUMENT SCAN (OUTPATIENT)
Dept: HOME HEALTH SERVICES | Facility: HOSPITAL | Age: 87
End: 2022-08-18
Payer: MEDICARE

## 2022-08-18 ENCOUNTER — PATIENT MESSAGE (OUTPATIENT)
Dept: FAMILY MEDICINE | Facility: CLINIC | Age: 87
End: 2022-08-18

## 2022-08-18 ENCOUNTER — OFFICE VISIT (OUTPATIENT)
Dept: FAMILY MEDICINE | Facility: CLINIC | Age: 87
End: 2022-08-18
Payer: MEDICARE

## 2022-08-18 DIAGNOSIS — R53.1 WEAKNESS: Primary | ICD-10-CM

## 2022-08-18 PROCEDURE — 99213 PR OFFICE/OUTPT VISIT, EST, LEVL III, 20-29 MIN: ICD-10-PCS | Mod: 95,,, | Performed by: NURSE PRACTITIONER

## 2022-08-18 PROCEDURE — 1160F PR REVIEW ALL MEDS BY PRESCRIBER/CLIN PHARMACIST DOCUMENTED: ICD-10-PCS | Mod: CPTII,95,, | Performed by: NURSE PRACTITIONER

## 2022-08-18 PROCEDURE — 99213 OFFICE O/P EST LOW 20 MIN: CPT | Mod: 95,,, | Performed by: NURSE PRACTITIONER

## 2022-08-18 PROCEDURE — 1160F RVW MEDS BY RX/DR IN RCRD: CPT | Mod: CPTII,95,, | Performed by: NURSE PRACTITIONER

## 2022-08-18 PROCEDURE — 1159F PR MEDICATION LIST DOCUMENTED IN MEDICAL RECORD: ICD-10-PCS | Mod: CPTII,95,, | Performed by: NURSE PRACTITIONER

## 2022-08-18 PROCEDURE — 1159F MED LIST DOCD IN RCRD: CPT | Mod: CPTII,95,, | Performed by: NURSE PRACTITIONER

## 2022-08-20 ENCOUNTER — PATIENT MESSAGE (OUTPATIENT)
Dept: FAMILY MEDICINE | Facility: CLINIC | Age: 87
End: 2022-08-20
Payer: MEDICARE

## 2022-08-20 DIAGNOSIS — N39.0 URINARY TRACT INFECTION WITHOUT HEMATURIA, SITE UNSPECIFIED: ICD-10-CM

## 2022-08-22 ENCOUNTER — TELEPHONE (OUTPATIENT)
Dept: FAMILY MEDICINE | Facility: CLINIC | Age: 87
End: 2022-08-22
Payer: MEDICARE

## 2022-08-22 ENCOUNTER — PATIENT MESSAGE (OUTPATIENT)
Dept: FAMILY MEDICINE | Facility: CLINIC | Age: 87
End: 2022-08-22
Payer: MEDICARE

## 2022-08-22 DIAGNOSIS — N39.0 URINARY TRACT INFECTION WITHOUT HEMATURIA, SITE UNSPECIFIED: Primary | ICD-10-CM

## 2022-08-22 RX ORDER — CIPROFLOXACIN 500 MG/1
500 TABLET ORAL 2 TIMES DAILY
Qty: 14 TABLET | Refills: 0 | Status: SHIPPED | OUTPATIENT
Start: 2022-08-22 | End: 2022-08-29

## 2022-08-22 RX ORDER — CIPROFLOXACIN 500 MG/1
500 TABLET ORAL 2 TIMES DAILY
Qty: 14 TABLET | Refills: 0 | Status: SHIPPED | OUTPATIENT
Start: 2022-08-22 | End: 2022-08-22 | Stop reason: SDUPTHER

## 2022-08-22 NOTE — TELEPHONE ENCOUNTER
Daughter states that she had 500 mg of cipro from when she had a uti, pt started it Saturday, only 5 pills were in bottle, so it is not a full course. Please advise

## 2022-08-22 NOTE — TELEPHONE ENCOUNTER
I received your message which was reviewed along with the the medication list and allergies that we have below.  Please review it for accuracy to make sure that we have the most recent records on your history.     Based on this, the following orders were placed AND/OR medicines were sent in.     No orders of the defined types were placed in this encounter.      Medications written and sent at this time include:  Medications Ordered This Encounter   Medications    ciprofloxacin HCl (CIPRO) 500 MG tablet     Sig: Take 1 tablet (500 mg total) by mouth 2 (two) times daily. for 7 days     Dispense:  14 tablet     Refill:  0       Your pharmacy(ies) of choice at this time on record include the list below and any medications would have been sent to the one at the top.    [id number] - [place] - [address]  [address] [address]  Phone: [phone] Fax: [phone]    [facility] - [place], [place] - [address]   [address]  [address]  [facility] [address]  Phone: [phone] Fax: [phone]      Thank you for choosing us as your healthcare provider!  Dr. Adithya Valerio    ALLERGY LIST  Review of patient's allergies indicates:   Allergen Reactions    Hydrocodone-acetaminophen     Iodine and iodide containing products     Meperidine     Morphine     Penicillins     Sulfa (sulfonamide antibiotics)     Macrobid [nitrofurantoin monohyd/m-cryst] Nausea And Vomiting       MEDICATION LIST  Current Outpatient Medications on File Prior to Visit   Medication Sig Dispense Refill    alendronate (FOSAMAX) 70 MG tablet TAKE 1 TABLET BY MOUTH  WEEKLY WITH 8 OZ OF PLAIN  WATER 30 MINUTES BEFORE  FIRST FOOD, DRINK OR MEDS.  STAY UPRIGHT FOR 30 MINS 12 tablet 4    ascorbic acid,sod/zinc ox,gluc (ZINC AND C ORAL) Take by mouth.      cetirizine (ZYRTEC) 10 MG tablet Take 1 tablet (10 mg total) by mouth once  daily. 90 tablet 1    cyanocobalamin (VITAMIN B-12) 1000 MCG tablet Take 1 tablet (1,000 mcg total) by mouth once daily. 90 tablet 1    cyclobenzaprine (FLEXERIL) 5 MG tablet Take 1 tablet (5 mg total) by mouth nightly as needed for Muscle spasms. 30 tablet 0    diclofenac sodium (VOLTAREN) 1 % Gel Apply 2 g topically once daily. Apply to knees (Patient not taking: Reported on 3/11/2022) 100 g 0    EScitalopram oxalate (LEXAPRO) 5 MG Tab Take 1 tablet (5 mg total) by mouth once daily. 90 tablet 4    hydroCHLOROthiazide (HYDRODIURIL) 12.5 MG Tab Take 1 tablet (12.5 mg total) by mouth once daily. 90 tablet 4    levothyroxine (SYNTHROID, LEVOTHROID) 175 MCG tablet Take 1 tablet (175 mcg total) by mouth once daily. 90 tablet 4    metoprolol succinate (TOPROL-XL) 25 MG 24 hr tablet Take 1 tablet (25 mg total) by mouth once daily. 90 tablet 4    montelukast (SINGULAIR) 10 mg tablet Take 1 tablet (10 mg total) by mouth once daily. 90 tablet 4    multivitamin (THERAGRAN) per tablet Take 1 tablet by mouth once daily.      niacin 500 MG Tab Take 500 mg by mouth 2 (two) times daily.      pantoprazole (PROTONIX) 40 MG tablet Take 1 tablet (40 mg total) by mouth once daily. 90 tablet 4    potassium chloride SA (K-DUR,KLOR-CON) 20 MEQ tablet Take 1 tablet (20 mEq total) by mouth once daily. 90 tablet 4    rivaroxaban (XARELTO) 20 mg Tab Take 1 tablet (20 mg total) by mouth once daily. 90 tablet 4    vit C,Y-Io-fhsqo-lutein-zeaxan (PRESERVISION AREDS 2) 555-391-35-1 mg-unit-mg-mg Cap Take by mouth.      vitamin D (VITAMIN D3) 1000 units Tab Take 5,000 Units by mouth once daily.       No current facility-administered medications on file prior to visit.       HEALTH MAINTENANCE THAT IS OVERDUE OR NEEDS TO BE UPDATED ON OUR CHART IS LISTED BELOW.  IF YOU HAVE HAD IT DONE ELSEWHERE, PLEASE SEND US DATES AND RECORDS IF YOU HAVE THEM TO MAKE YOUR CHART ACCURATE.  IF YOU HAVE NOT HAD THESE DONE AND ARE READY FOR US TO  SCHEDULE THEM, PLEASE SEND US A MESSAGE.  There are no preventive care reminders to display for this patient.    DISCLAIMER: This note was compiled by using a speech recognition dictation system and therefore please be aware that typographical / speech recognition errors can and do occur.  Please contact me if you see any errors specifically.    Adithya Valerio MD  We Offer Telehealth & Same Day Appointments!   Book your Telehealth appointment with me through my nurse or   Clinic appointments on [employer]!  [phone]     Check out my Facebook Page and Follow Me at: CLICK HERE    Check out my website at [employer] by clicking on: CLICK HERE    To Schedule appointments online, go to [employer]: CLICK HERE     Location: [email or web address]    [address] [place] [address]    FAX: [phone]

## 2022-08-23 ENCOUNTER — TELEPHONE (OUTPATIENT)
Dept: FAMILY MEDICINE | Facility: CLINIC | Age: 87
End: 2022-08-23
Payer: MEDICARE

## 2022-08-23 NOTE — TELEPHONE ENCOUNTER
Reached out to [facility] department, spoke with Laura and she will send over results immediately.

## 2022-08-23 NOTE — TELEPHONE ENCOUNTER
----- Message from Adithya Valerio MD sent at 8/23/2022 12:56 PM CDT -----  Please check into this.  Still not updating on Care everywhere.  ----- Message -----  From: Lillian Brandon LPN  Sent: 8/23/2022   8:27 AM CDT  To: Adithya Valerio MD    Check on urine culture for ms. Laura Rosenthal.    ----- Message -----  From: Lillian Brandon LPN  Sent: 8/22/2022   2:27 PM CDT  To: Lillian Brandon LPN    Remind Dr. Valerio to on urine [facility] results.

## 2022-08-24 ENCOUNTER — EXTERNAL HOME HEALTH (OUTPATIENT)
Dept: HOME HEALTH SERVICES | Facility: HOSPITAL | Age: 87
End: 2022-08-24
Payer: MEDICARE

## 2022-08-25 ENCOUNTER — PATIENT MESSAGE (OUTPATIENT)
Dept: FAMILY MEDICINE | Facility: CLINIC | Age: 87
End: 2022-08-25
Payer: MEDICARE

## 2022-08-29 ENCOUNTER — TELEPHONE (OUTPATIENT)
Dept: FAMILY MEDICINE | Facility: CLINIC | Age: 87
End: 2022-08-29
Payer: MEDICARE

## 2022-08-29 ENCOUNTER — DOCUMENT SCAN (OUTPATIENT)
Dept: HOME HEALTH SERVICES | Facility: HOSPITAL | Age: 87
End: 2022-08-29
Payer: MEDICARE

## 2022-08-29 DIAGNOSIS — N39.0 URINARY TRACT INFECTION WITH HEMATURIA, SITE UNSPECIFIED: Primary | ICD-10-CM

## 2022-08-29 DIAGNOSIS — R31.9 URINARY TRACT INFECTION WITH HEMATURIA, SITE UNSPECIFIED: Primary | ICD-10-CM

## 2022-08-29 RX ORDER — ONDANSETRON 4 MG/1
4 TABLET, ORALLY DISINTEGRATING ORAL EVERY 6 HOURS PRN
Qty: 30 TABLET | Refills: 0 | Status: SHIPPED | OUTPATIENT
Start: 2022-08-29 | End: 2022-09-28

## 2022-08-29 RX ORDER — NITROFURANTOIN 25; 75 MG/1; MG/1
100 CAPSULE ORAL 2 TIMES DAILY
Qty: 20 CAPSULE | Refills: 0 | Status: SHIPPED | OUTPATIENT
Start: 2022-08-29 | End: 2022-09-09

## 2022-08-29 NOTE — TELEPHONE ENCOUNTER
Spoke with patient's daughter, to inform of medications that have been sent in as well as follow up testing that will need to be done and referral to Urology.  She expressed understanding of the recommendations discussed.

## 2022-08-29 NOTE — TELEPHONE ENCOUNTER
I have signed for the following orders AND/OR meds.  Please call the patient and ask the patient to schedule the testing AND/OR inform about any medications that were sent.      Orders Placed This Encounter   Procedures    Urinalysis, Reflex to Urine Culture Urine, Clean Catch     Standing Status:   Future     Standing Expiration Date:   2/29/2024     Order Specific Question:   Preferred Collection Type     Answer:   Urine, Clean Catch     Order Specific Question:   Specimen Source     Answer:   Urine    Ambulatory referral/consult to Urology     Standing Status:   Future     Standing Expiration Date:   9/29/2023     Referral Priority:   Urgent     Referral Type:   Consultation     Referral Reason:   Specialty Services Required     Requested Specialty:   Urology     Number of Visits Requested:   1       Medications Ordered This Encounter   Medications    nitrofurantoin, macrocrystal-monohydrate, (MACROBID) 100 MG capsule     Sig: Take 1 capsule (100 mg total) by mouth 2 (two) times daily. for 10 days     Dispense:  20 capsule     Refill:  0    ondansetron (ZOFRAN-ODT) 4 MG TbDL     Sig: Take 1 tablet (4 mg total) by mouth every 6 (six) hours as needed (nausea).     Dispense:  30 tablet     Refill:  0

## 2022-08-29 NOTE — TELEPHONE ENCOUNTER
Attempted to call patient and her daughter, no answer so I LVM asking them to give me a call ASAP.

## 2022-09-02 ENCOUNTER — DOCUMENT SCAN (OUTPATIENT)
Dept: HOME HEALTH SERVICES | Facility: HOSPITAL | Age: 87
End: 2022-09-02
Payer: MEDICARE

## 2022-09-02 NOTE — PROGRESS NOTES
Subjective:       Patient ID: Laura Yu is a 87 y.o. female.    Primary Care Telemedicine Note    The patient location is:  Patient Home   The chief complaint leading to consultation is: weakness  Total time spent with patient: 15 mins    Visit type: Virtual visit with synchronous audio only and video  Each patient to whom he or she provides medical services by telemedicine is:  (1) informed of the relationship between the physician and patient and the respective role of any other health care provider with respect to management of the patient; and (2) notified that he or she may decline to receive medical services by telemedicine and may withdraw from such care at any time.      Chief Complaint: No chief complaint on file.    Fatigue  This is a new problem. The current episode started in the past 7 days. The problem occurs constantly. The problem has been unchanged. Associated symptoms include fatigue, headaches, nausea and weakness. Pertinent negatives include no abdominal pain, arthralgias, chest pain, coughing, fever, joint swelling, myalgias, neck pain, rash, sore throat or vomiting. Nothing aggravates the symptoms. She has tried rest and drinking for the symptoms. The treatment provided no relief.     Review of Systems   Constitutional:  Positive for fatigue. Negative for activity change, fever and unexpected weight change.   HENT:  Negative for ear pain, hearing loss, rhinorrhea, sore throat and trouble swallowing.    Eyes:  Negative for pain, discharge and visual disturbance.   Respiratory:  Negative for cough, chest tightness, shortness of breath and wheezing.    Cardiovascular:  Negative for chest pain and palpitations.   Gastrointestinal:  Positive for nausea. Negative for abdominal pain, blood in stool, constipation, diarrhea and vomiting.   Endocrine: Negative for polydipsia and polyuria.   Genitourinary:  Negative for difficulty urinating, dysuria, hematuria and menstrual problem.    Musculoskeletal:  Negative for arthralgias, joint swelling, myalgias and neck pain.   Skin:  Negative for color change and rash.   Neurological:  Positive for weakness and headaches. Negative for dizziness.   Psychiatric/Behavioral:  Positive for dysphoric mood. Negative for confusion and sleep disturbance. The patient is not nervous/anxious.          Objective:     Current Outpatient Medications   Medication Sig Dispense Refill    alendronate (FOSAMAX) 70 MG tablet TAKE 1 TABLET BY MOUTH  WEEKLY WITH 8 OZ OF PLAIN  WATER 30 MINUTES BEFORE  FIRST FOOD, DRINK OR MEDS.  STAY UPRIGHT FOR 30 MINS 12 tablet 4    ascorbic acid,sod/zinc ox,gluc (ZINC AND C ORAL) Take by mouth.      cetirizine (ZYRTEC) 10 MG tablet Take 1 tablet (10 mg total) by mouth once daily. 90 tablet 1    cyanocobalamin (VITAMIN B-12) 1000 MCG tablet Take 1 tablet (1,000 mcg total) by mouth once daily. 90 tablet 1    cyclobenzaprine (FLEXERIL) 5 MG tablet Take 1 tablet (5 mg total) by mouth nightly as needed for Muscle spasms. 30 tablet 0    diclofenac sodium (VOLTAREN) 1 % Gel Apply 2 g topically once daily. Apply to knees (Patient not taking: Reported on 3/11/2022) 100 g 0    EScitalopram oxalate (LEXAPRO) 5 MG Tab Take 1 tablet (5 mg total) by mouth once daily. 90 tablet 4    hydroCHLOROthiazide (HYDRODIURIL) 12.5 MG Tab Take 1 tablet (12.5 mg total) by mouth once daily. 90 tablet 4    levothyroxine (SYNTHROID, LEVOTHROID) 175 MCG tablet Take 1 tablet (175 mcg total) by mouth once daily. 90 tablet 4    metoprolol succinate (TOPROL-XL) 25 MG 24 hr tablet Take 1 tablet (25 mg total) by mouth once daily. 90 tablet 4    montelukast (SINGULAIR) 10 mg tablet Take 1 tablet (10 mg total) by mouth once daily. 90 tablet 4    multivitamin (THERAGRAN) per tablet Take 1 tablet by mouth once daily.      niacin 500 MG Tab Take 500 mg by mouth 2 (two) times daily.      nitrofurantoin, macrocrystal-monohydrate, (MACROBID) 100 MG capsule Take 1 capsule (100 mg  total) by mouth 2 (two) times daily. for 10 days 20 capsule 0    ondansetron (ZOFRAN-ODT) 4 MG TbDL Take 1 tablet (4 mg total) by mouth every 6 (six) hours as needed (nausea). 30 tablet 0    pantoprazole (PROTONIX) 40 MG tablet Take 1 tablet (40 mg total) by mouth once daily. 90 tablet 4    potassium chloride SA (K-DUR,KLOR-CON) 20 MEQ tablet Take 1 tablet (20 mEq total) by mouth once daily. 90 tablet 4    rivaroxaban (XARELTO) 20 mg Tab Take 1 tablet (20 mg total) by mouth once daily. 90 tablet 4    vit C,W-Ey-wwkod-lutein-zeaxan (PRESERVISION AREDS 2) 691-071-70-1 mg-unit-mg-mg Cap Take by mouth.      vitamin D (VITAMIN D3) 1000 units Tab Take 5,000 Units by mouth once daily.       No current facility-administered medications for this visit.       Physical Exam  Constitutional:       Appearance: She is well-developed.   HENT:      Head: Normocephalic.   Pulmonary:      Effort: Pulmonary effort is normal. No respiratory distress.   Musculoskeletal:      Cervical back: Normal range of motion.   Neurological:      Mental Status: She is alert and oriented to person, place, and time.   Psychiatric:         Thought Content: Thought content normal.         Judgment: Judgment normal.       Assessment:       1. Weakness          Plan:   Weakness  -     Basic Metabolic Panel; Future; Expected date: 08/18/2022  -     CBC Auto Differential; Future; Expected date: 08/18/2022  -     Urinalysis, Reflex to Urine Culture Urine, Clean Catch; Future  -     COVID-19 Routine Screening; Future; Expected date: 08/18/2022        No follow-ups on file.    There are no Patient Instructions on file for this visit.

## 2022-09-09 ENCOUNTER — LAB VISIT (OUTPATIENT)
Dept: LAB | Facility: HOSPITAL | Age: 87
End: 2022-09-09
Attending: FAMILY MEDICINE
Payer: MEDICARE

## 2022-09-09 ENCOUNTER — OFFICE VISIT (OUTPATIENT)
Dept: FAMILY MEDICINE | Facility: CLINIC | Age: 87
End: 2022-09-09
Payer: MEDICARE

## 2022-09-09 VITALS
HEART RATE: 76 BPM | WEIGHT: 250 LBS | HEIGHT: 64 IN | OXYGEN SATURATION: 98 % | DIASTOLIC BLOOD PRESSURE: 78 MMHG | TEMPERATURE: 97 F | BODY MASS INDEX: 42.68 KG/M2 | SYSTOLIC BLOOD PRESSURE: 128 MMHG

## 2022-09-09 DIAGNOSIS — N39.0 E-COLI UTI: ICD-10-CM

## 2022-09-09 DIAGNOSIS — B96.20 E-COLI UTI: ICD-10-CM

## 2022-09-09 DIAGNOSIS — R53.1 GENERAL WEAKNESS: ICD-10-CM

## 2022-09-09 DIAGNOSIS — N39.0 URINARY TRACT INFECTION WITH HEMATURIA, SITE UNSPECIFIED: ICD-10-CM

## 2022-09-09 DIAGNOSIS — R31.9 URINARY TRACT INFECTION WITH HEMATURIA, SITE UNSPECIFIED: Primary | ICD-10-CM

## 2022-09-09 DIAGNOSIS — N39.0 URINARY TRACT INFECTION WITH HEMATURIA, SITE UNSPECIFIED: Primary | ICD-10-CM

## 2022-09-09 DIAGNOSIS — E86.0 DEHYDRATION: ICD-10-CM

## 2022-09-09 DIAGNOSIS — R11.0 NAUSEA: ICD-10-CM

## 2022-09-09 DIAGNOSIS — R31.9 URINARY TRACT INFECTION WITH HEMATURIA, SITE UNSPECIFIED: ICD-10-CM

## 2022-09-09 DIAGNOSIS — F33.42 RECURRENT MAJOR DEPRESSIVE DISORDER, IN FULL REMISSION: ICD-10-CM

## 2022-09-09 LAB
BILIRUB UR QL STRIP: NEGATIVE
CLARITY UR: CLEAR
COLOR UR: YELLOW
GLUCOSE UR QL STRIP: NEGATIVE
HGB UR QL STRIP: NEGATIVE
KETONES UR QL STRIP: NEGATIVE
LEUKOCYTE ESTERASE UR QL STRIP: NEGATIVE
NITRITE UR QL STRIP: NEGATIVE
PH UR STRIP: 7 [PH] (ref 5–8)
PROT UR QL STRIP: ABNORMAL
SP GR UR STRIP: 1.01 (ref 1–1.03)
URN SPEC COLLECT METH UR: ABNORMAL

## 2022-09-09 PROCEDURE — 99214 OFFICE O/P EST MOD 30 MIN: CPT | Mod: S$GLB,,, | Performed by: FAMILY MEDICINE

## 2022-09-09 PROCEDURE — 1101F PT FALLS ASSESS-DOCD LE1/YR: CPT | Mod: CPTII,S$GLB,, | Performed by: FAMILY MEDICINE

## 2022-09-09 PROCEDURE — 3074F PR MOST RECENT SYSTOLIC BLOOD PRESSURE < 130 MM HG: ICD-10-PCS | Mod: CPTII,S$GLB,, | Performed by: FAMILY MEDICINE

## 2022-09-09 PROCEDURE — 81003 URINALYSIS AUTO W/O SCOPE: CPT | Mod: PO | Performed by: FAMILY MEDICINE

## 2022-09-09 PROCEDURE — 1159F MED LIST DOCD IN RCRD: CPT | Mod: CPTII,S$GLB,, | Performed by: FAMILY MEDICINE

## 2022-09-09 PROCEDURE — 1126F PR PAIN SEVERITY QUANTIFIED, NO PAIN PRESENT: ICD-10-PCS | Mod: CPTII,S$GLB,, | Performed by: FAMILY MEDICINE

## 2022-09-09 PROCEDURE — 1101F PR PT FALLS ASSESS DOC 0-1 FALLS W/OUT INJ PAST YR: ICD-10-PCS | Mod: CPTII,S$GLB,, | Performed by: FAMILY MEDICINE

## 2022-09-09 PROCEDURE — 3288F FALL RISK ASSESSMENT DOCD: CPT | Mod: CPTII,S$GLB,, | Performed by: FAMILY MEDICINE

## 2022-09-09 PROCEDURE — 3288F PR FALLS RISK ASSESSMENT DOCUMENTED: ICD-10-PCS | Mod: CPTII,S$GLB,, | Performed by: FAMILY MEDICINE

## 2022-09-09 PROCEDURE — 1126F AMNT PAIN NOTED NONE PRSNT: CPT | Mod: CPTII,S$GLB,, | Performed by: FAMILY MEDICINE

## 2022-09-09 PROCEDURE — 3074F SYST BP LT 130 MM HG: CPT | Mod: CPTII,S$GLB,, | Performed by: FAMILY MEDICINE

## 2022-09-09 PROCEDURE — 1159F PR MEDICATION LIST DOCUMENTED IN MEDICAL RECORD: ICD-10-PCS | Mod: CPTII,S$GLB,, | Performed by: FAMILY MEDICINE

## 2022-09-09 PROCEDURE — 99999 PR PBB SHADOW E&M-EST. PATIENT-LVL V: ICD-10-PCS | Mod: PBBFAC,,, | Performed by: FAMILY MEDICINE

## 2022-09-09 PROCEDURE — 99214 PR OFFICE/OUTPT VISIT, EST, LEVL IV, 30-39 MIN: ICD-10-PCS | Mod: S$GLB,,, | Performed by: FAMILY MEDICINE

## 2022-09-09 PROCEDURE — 1160F PR REVIEW ALL MEDS BY PRESCRIBER/CLIN PHARMACIST DOCUMENTED: ICD-10-PCS | Mod: CPTII,S$GLB,, | Performed by: FAMILY MEDICINE

## 2022-09-09 PROCEDURE — 3078F DIAST BP <80 MM HG: CPT | Mod: CPTII,S$GLB,, | Performed by: FAMILY MEDICINE

## 2022-09-09 PROCEDURE — 99999 PR PBB SHADOW E&M-EST. PATIENT-LVL V: CPT | Mod: PBBFAC,,, | Performed by: FAMILY MEDICINE

## 2022-09-09 PROCEDURE — 3078F PR MOST RECENT DIASTOLIC BLOOD PRESSURE < 80 MM HG: ICD-10-PCS | Mod: CPTII,S$GLB,, | Performed by: FAMILY MEDICINE

## 2022-09-09 PROCEDURE — 1160F RVW MEDS BY RX/DR IN RCRD: CPT | Mod: CPTII,S$GLB,, | Performed by: FAMILY MEDICINE

## 2022-09-09 RX ORDER — ONDANSETRON 4 MG/1
4 TABLET, FILM COATED ORAL EVERY 8 HOURS PRN
Qty: 30 TABLET | Refills: 0 | Status: SHIPPED | OUTPATIENT
Start: 2022-09-09

## 2022-09-09 RX ORDER — ESCITALOPRAM OXALATE 10 MG/1
10 TABLET ORAL DAILY
Qty: 90 TABLET | Refills: 4 | Status: SHIPPED | OUTPATIENT
Start: 2022-09-09 | End: 2022-12-19 | Stop reason: SDUPTHER

## 2022-09-09 RX ORDER — SODIUM CHLORIDE 9 MG/ML
INJECTION, SOLUTION INTRAVENOUS
Qty: 1000 ML | Refills: 3 | Status: SHIPPED | OUTPATIENT
Start: 2022-09-09

## 2022-09-09 NOTE — PROGRESS NOTES
Please call the patient with the results. [phone]  Your urinalysis is normal.  Laura, Your urinalysis is normal, except for trace protein.  There is no infection.  I still recommend that you follow-up/reestablish care with Urology.      If you have any other tests that were ordered we will notify you once they are available.  Please let me know if you have any questions concerning this test.  We will discuss further at your next office appointment.    Also please see below for health maintenance items that are due so we may discuss those at your next office visit:    Influenza Vaccine(1) due on 09/01/2022

## 2022-09-09 NOTE — PATIENT INSTRUCTIONS
Follow up in about 3 months (around 12/9/2022), or if symptoms worsen or fail to improve, for Med refills.     Dear patient,   As a result of recent federal legislation (The Federal Cures Act), you may receive lab or pathology results from your visit in your [facility] account before your physician is able to contact you. Your physician or their representative will relay the results to you with their recommendations at their soonest availability.     If no improvement in symptoms or symptoms worsen, please be advised to call MD, follow-up at clinic and/or go to ER if becomes severe.    Adithya Valerio M.D.        We Offer TELEHEALTH & Same Day Appointments!   Book your Telehealth appointment with me through my nurse or   Clinic appointments on [facility]!    [address] [place] [address]    Office: [phone]   FAX: [phone]    Check out my Facebook Page and Follow Me at: [email or web address]    Check out my website at [employer] by clicking on: [email or web address]    To Schedule appointments online, go to [facility]: [email or web address]

## 2022-09-10 PROBLEM — N39.0 URINARY TRACT INFECTION WITH HEMATURIA: Chronic | Status: ACTIVE | Noted: 2022-09-09

## 2022-09-10 PROBLEM — R53.1 GENERAL WEAKNESS: Chronic | Status: ACTIVE | Noted: 2022-03-11

## 2022-09-10 PROBLEM — R31.9 URINARY TRACT INFECTION WITH HEMATURIA: Chronic | Status: ACTIVE | Noted: 2022-09-09

## 2022-09-10 PROBLEM — E86.0 DEHYDRATION: Chronic | Status: ACTIVE | Noted: 2022-09-09

## 2022-09-10 RX ORDER — NEOMYCIN SULFATE, POLYMYXIN B SULFATE AND DEXAMETHASONE 3.5; 10000; 1 MG/ML; [USP'U]/ML; MG/ML
SUSPENSION/ DROPS OPHTHALMIC
COMMUNITY
Start: 2022-08-03

## 2022-09-11 NOTE — ASSESSMENT & PLAN NOTE
Patient was treated with Macrobid.  Repeat urinalysis shows UTI has cleared.  Patient previously followed urology Dr. Jose Rosas.  She will follow-up with Urology for further evaluation.

## 2022-09-11 NOTE — ASSESSMENT & PLAN NOTE
Referral to home health for PT OT skilled nursing insert eval and treat.  Patient also with dehydration.  Would like to get patient IV fluids at home as needed for dehydration.

## 2022-09-11 NOTE — ASSESSMENT & PLAN NOTE
Increase Lexapro 10 milligrams daily.Please be advised of condition course.  SNRI/SSRI is first-line treatment for this condition.  Please be advised of the risk of discontinuing this medication without tapering/contacting MD.  Patient has been advised of side effects, and all questions were answered.  Patient voiced understanding.  Patient will follow up routinely and notify us if having any side effects or worsening or persistent symptoms.  ER precautions were given. Antidepressant/Antianxiety Medication Initiation:  Patient informed of risks, benefits, and potential side effects of medication and accepts informed consent.  Common side effects include nausea, fatigue, headache, insomnia, etc see medication insert for complete side effect profile.  Most importantly be advised of the possibility of new or worsening suicidal thoughts/depression/anxiety etcetera.  Please be advised to stop the medication immediately and seek urgent treatment if this occurs.  Therefore please do not to abruptly discontinue medication without physician guidance except in cases of sudden onset or worsening of SI.

## 2022-09-15 ENCOUNTER — DOCUMENT SCAN (OUTPATIENT)
Dept: HOME HEALTH SERVICES | Facility: HOSPITAL | Age: 87
End: 2022-09-15
Payer: MEDICARE

## 2022-09-18 ENCOUNTER — DOCUMENT SCAN (OUTPATIENT)
Dept: HOME HEALTH SERVICES | Facility: HOSPITAL | Age: 87
End: 2022-09-18
Payer: MEDICARE

## 2022-09-19 ENCOUNTER — TELEPHONE (OUTPATIENT)
Dept: FAMILY MEDICINE | Facility: CLINIC | Age: 87
End: 2022-09-19
Payer: MEDICARE

## 2022-09-19 DIAGNOSIS — B37.9 YEAST INFECTION: Primary | ICD-10-CM

## 2022-09-19 RX ORDER — FLUCONAZOLE 150 MG/1
150 TABLET ORAL
Qty: 3 TABLET | Refills: 0 | Status: SHIPPED | OUTPATIENT
Start: 2022-09-19 | End: 2022-09-26

## 2022-09-19 RX ORDER — NYSTATIN 100000 U/G
CREAM TOPICAL 2 TIMES DAILY
Qty: 30 G | Refills: 0 | Status: SHIPPED | OUTPATIENT
Start: 2022-09-19

## 2022-09-19 NOTE — TELEPHONE ENCOUNTER
----- Message from Belinda Wilson sent at 9/19/2022 11:05 AM CDT -----  Contact: Griselda-[facility]  Griselda from [facility] is requesting a callback from the nurse in regards to patient care please.     Please call Griselda at [phone]    Thanks

## 2022-09-19 NOTE — TELEPHONE ENCOUNTER
I have signed for the following orders AND/OR meds.  Please call the patient and ask the patient to schedule the testing AND/OR inform about any medications that were sent.      No orders of the defined types were placed in this encounter.      Medications Ordered This Encounter   Medications    fluconazole (DIFLUCAN) 150 MG Tab     Sig: Take 1 tablet (150 mg total) by mouth every 72 hours. for 3 doses     Dispense:  3 tablet     Refill:  0    nystatin (MYCOSTATIN) cream     Sig: Apply topically 2 (two) times daily.     Dispense:  30 g     Refill:  0

## 2022-09-19 NOTE — TELEPHONE ENCOUNTER
Patient was recently on antibiotics and now needs Nystatin cream sent in to her pharmacy.  Yeast is mostly under her breasts. Please send to [facility] in [place].

## 2022-10-02 ENCOUNTER — DOCUMENT SCAN (OUTPATIENT)
Dept: HOME HEALTH SERVICES | Facility: HOSPITAL | Age: 87
End: 2022-10-02
Payer: MEDICARE

## 2022-10-07 PROCEDURE — G0179 PR HOME HEALTH MD RECERTIFICATION: ICD-10-PCS | Mod: ,,, | Performed by: FAMILY MEDICINE

## 2022-10-07 PROCEDURE — G0179 MD RECERTIFICATION HHA PT: HCPCS | Mod: ,,, | Performed by: FAMILY MEDICINE

## 2022-10-10 ENCOUNTER — DOCUMENT SCAN (OUTPATIENT)
Dept: HOME HEALTH SERVICES | Facility: HOSPITAL | Age: 87
End: 2022-10-10
Payer: MEDICARE

## 2022-10-19 ENCOUNTER — DOCUMENT SCAN (OUTPATIENT)
Dept: HOME HEALTH SERVICES | Facility: HOSPITAL | Age: 87
End: 2022-10-19
Payer: MEDICARE

## 2022-11-02 ENCOUNTER — EXTERNAL HOME HEALTH (OUTPATIENT)
Dept: HOME HEALTH SERVICES | Facility: HOSPITAL | Age: 87
End: 2022-11-02
Payer: MEDICARE

## 2022-11-18 ENCOUNTER — DOCUMENT SCAN (OUTPATIENT)
Dept: HOME HEALTH SERVICES | Facility: HOSPITAL | Age: 87
End: 2022-11-18
Payer: MEDICARE

## 2022-12-06 PROCEDURE — G0179 PR HOME HEALTH MD RECERTIFICATION: ICD-10-PCS | Mod: ,,, | Performed by: FAMILY MEDICINE

## 2022-12-06 PROCEDURE — G0179 MD RECERTIFICATION HHA PT: HCPCS | Mod: ,,, | Performed by: FAMILY MEDICINE

## 2022-12-12 PROBLEM — N39.0 E-COLI UTI: Status: RESOLVED | Noted: 2022-09-09 | Resolved: 2022-12-12

## 2022-12-12 PROBLEM — N39.0 URINARY TRACT INFECTION WITH HEMATURIA: Status: RESOLVED | Noted: 2022-09-09 | Resolved: 2022-12-12

## 2022-12-12 PROBLEM — B96.20 E-COLI UTI: Status: RESOLVED | Noted: 2022-09-09 | Resolved: 2022-12-12

## 2022-12-12 PROBLEM — R31.9 URINARY TRACT INFECTION WITH HEMATURIA: Status: RESOLVED | Noted: 2022-09-09 | Resolved: 2022-12-12

## 2022-12-14 ENCOUNTER — DOCUMENT SCAN (OUTPATIENT)
Dept: HOME HEALTH SERVICES | Facility: HOSPITAL | Age: 87
End: 2022-12-14
Payer: MEDICARE

## 2022-12-19 DIAGNOSIS — F33.42 RECURRENT MAJOR DEPRESSIVE DISORDER, IN FULL REMISSION: ICD-10-CM

## 2022-12-19 RX ORDER — ESCITALOPRAM OXALATE 10 MG/1
10 TABLET ORAL DAILY
Qty: 90 TABLET | Refills: 4 | Status: SHIPPED | OUTPATIENT
Start: 2022-12-19 | End: 2023-12-19

## 2022-12-19 NOTE — TELEPHONE ENCOUNTER
Care Due:                  Date            Visit Type   Department     Provider  --------------------------------------------------------------------------------                                EP -                              PRIMARY      [facility] FAMILY  Last Visit: 09-      CARE ([facility])   MEDICINE       Adithya Valerio  Next Visit: None Scheduled  None         None Found                                                            Last  Test          Frequency    Reason                     Performed    Due Date  --------------------------------------------------------------------------------    CMP.........  12 months..  hydroCHLOROthiazide,       03- 03-                             potassium................    TSH.........  12 months..  levothyroxine............  03- 03-    [facility] Embedded Care Gaps. Reference number: [id number]. 12/19/2022   2:43:34 PM CST

## 2022-12-19 NOTE — TELEPHONE ENCOUNTER
----- Message from Samara Shellie sent at 12/19/2022  2:34 PM CST -----  Contact: Laura  .Type:  RX Refill Request    Who Called: Laura  Refill or New Rx:refill  RX Name and Strength:EScitalopram oxalate (LEXAPRO) 10 MG tablet  How is the patient currently taking it? (ex. 1XDay):as prescribed  Is this a 30 day or 90 day RX:30  Preferred Pharmacy with phone number:  [facility] - [address] [address]  Phone: [phone] Fax: [phone]  Local or Mail Order:local  Ordering Provider:Dr. Valerio  Would the patient rather a call back or a response via [facility]? call  Best Call Back Number:[phone]  Additional Information: Patient request a prescription refill.   Thank you,  JARETH

## 2023-01-03 ENCOUNTER — EXTERNAL HOME HEALTH (OUTPATIENT)
Dept: HOME HEALTH SERVICES | Facility: HOSPITAL | Age: 88
End: 2023-01-03
Payer: MEDICARE

## 2023-01-19 ENCOUNTER — PATIENT MESSAGE (OUTPATIENT)
Dept: FAMILY MEDICINE | Facility: CLINIC | Age: 88
End: 2023-01-19
Payer: MEDICARE

## 2023-01-19 ENCOUNTER — TELEPHONE (OUTPATIENT)
Dept: FAMILY MEDICINE | Facility: CLINIC | Age: 88
End: 2023-01-19
Payer: MEDICARE

## 2023-01-19 ENCOUNTER — LAB VISIT (OUTPATIENT)
Dept: LAB | Facility: HOSPITAL | Age: 88
End: 2023-01-19
Attending: FAMILY MEDICINE
Payer: MEDICARE

## 2023-01-19 DIAGNOSIS — N39.0 URINARY TRACT INFECTION WITH HEMATURIA, SITE UNSPECIFIED: ICD-10-CM

## 2023-01-19 DIAGNOSIS — R31.9 URINARY TRACT INFECTION WITH HEMATURIA, SITE UNSPECIFIED: ICD-10-CM

## 2023-01-19 DIAGNOSIS — R31.9 URINARY TRACT INFECTION WITH HEMATURIA, SITE UNSPECIFIED: Primary | ICD-10-CM

## 2023-01-19 DIAGNOSIS — N39.0 URINARY TRACT INFECTION WITH HEMATURIA, SITE UNSPECIFIED: Primary | ICD-10-CM

## 2023-01-19 DIAGNOSIS — N39.0 URINARY TRACT INFECTION WITHOUT HEMATURIA, SITE UNSPECIFIED: Primary | ICD-10-CM

## 2023-01-19 LAB
BACTERIA #/AREA URNS HPF: ABNORMAL /HPF
BILIRUB UR QL STRIP: NEGATIVE
CLARITY UR: CLEAR
COLOR UR: YELLOW
GLUCOSE UR QL STRIP: NEGATIVE
HGB UR QL STRIP: ABNORMAL
KETONES UR QL STRIP: NEGATIVE
LEUKOCYTE ESTERASE UR QL STRIP: ABNORMAL
MICROSCOPIC COMMENT: ABNORMAL
NITRITE UR QL STRIP: POSITIVE
PH UR STRIP: 6 [PH] (ref 5–8)
PROT UR QL STRIP: NEGATIVE
RBC #/AREA URNS HPF: 2 /HPF (ref 0–4)
SP GR UR STRIP: 1.02 (ref 1–1.03)
URN SPEC COLLECT METH UR: ABNORMAL
WBC #/AREA URNS HPF: >100 /HPF (ref 0–5)

## 2023-01-19 PROCEDURE — 87086 URINE CULTURE/COLONY COUNT: CPT | Performed by: FAMILY MEDICINE

## 2023-01-19 PROCEDURE — 87077 CULTURE AEROBIC IDENTIFY: CPT | Performed by: FAMILY MEDICINE

## 2023-01-19 PROCEDURE — 87088 URINE BACTERIA CULTURE: CPT | Performed by: FAMILY MEDICINE

## 2023-01-19 PROCEDURE — 81000 URINALYSIS NONAUTO W/SCOPE: CPT | Mod: PO | Performed by: FAMILY MEDICINE

## 2023-01-19 PROCEDURE — 87186 SC STD MICRODIL/AGAR DIL: CPT | Performed by: FAMILY MEDICINE

## 2023-01-19 RX ORDER — CIPROFLOXACIN 500 MG/1
500 TABLET ORAL EVERY 12 HOURS
Qty: 20 TABLET | Refills: 0 | Status: SHIPPED | OUTPATIENT
Start: 2023-01-19

## 2023-01-19 NOTE — TELEPHONE ENCOUNTER
----- Message from Kaye Sarmiento sent at 1/19/2023  9:55 AM CST -----  Contact: Ruthie/[facility]  Ruthie would like a call back at [phone], fax at [phone] in regards to patient having symptoms of an UTI, and they would like an order for a urine specimen.   Thanks   Am

## 2023-01-20 ENCOUNTER — TELEPHONE (OUTPATIENT)
Dept: FAMILY MEDICINE | Facility: CLINIC | Age: 88
End: 2023-01-20
Payer: MEDICARE

## 2023-01-20 NOTE — TELEPHONE ENCOUNTER
Spoke to pt over the phone, inform her of test results . Pt verbalized understanding . She states that she got the prescription yesterday

## 2023-01-20 NOTE — TELEPHONE ENCOUNTER
----- Message from Letha Prado sent at 1/20/2023  9:20 AM CST -----  Contact: Griselda (Nurse @[facility])  Griselda would like a call back at [phone], in regards to the pt Urinalysis results.

## 2023-01-21 LAB — BACTERIA UR CULT: ABNORMAL

## 2023-01-24 ENCOUNTER — TELEPHONE (OUTPATIENT)
Dept: FAMILY MEDICINE | Facility: CLINIC | Age: 88
End: 2023-01-24
Payer: MEDICARE

## 2023-01-24 DIAGNOSIS — Z13.6 ENCOUNTER FOR LIPID SCREENING FOR CARDIOVASCULAR DISEASE: ICD-10-CM

## 2023-01-24 DIAGNOSIS — Z13.220 ENCOUNTER FOR LIPID SCREENING FOR CARDIOVASCULAR DISEASE: ICD-10-CM

## 2023-01-24 DIAGNOSIS — Z00.00 WELL ADULT EXAM: Primary | ICD-10-CM

## 2023-01-24 DIAGNOSIS — Z79.899 ENCOUNTER FOR LONG-TERM (CURRENT) USE OF MEDICATIONS: ICD-10-CM

## 2023-01-24 NOTE — TELEPHONE ENCOUNTER
I have signed for the following orders AND/OR meds.  Please call the patient and ask the patient to schedule the testing AND/OR inform about any medications that were sent.      Orders Placed This Encounter   Procedures    CBC Without Differential     Standing Status:   Future     Standing Expiration Date:   3/24/2024    Comprehensive Metabolic Panel     Standing Status:   Future     Standing Expiration Date:   3/24/2024    TSH     Standing Status:   Future     Standing Expiration Date:   3/24/2024    Hemoglobin A1C     Standing Status:   Future     Standing Expiration Date:   3/24/2024    Lipid Panel     Standing Status:   Future     Standing Expiration Date:   3/24/2024

## 2023-01-24 NOTE — TELEPHONE ENCOUNTER
Pt daughter is in town, denied seeing a NP, states you are her primary doctor, she is not seen often and she is wanting to be seen this week while daughter is down. Also is wanting lab orders to be sent to her home health so they can draw them. Please advise

## 2023-01-24 NOTE — TELEPHONE ENCOUNTER
----- Message from Nena Escobar sent at 1/24/2023  3:50 PM CST -----  Contact: pablo/ home health  With the home health nurse is calling to speak with the nurse regarding appointment. Reports needing to schedule an appointment and a week before the appointment the patient wants blood work done bur she wants to know if it can be done at home. Please give the patient a call back at [phone]  Thanks melani

## 2023-01-26 ENCOUNTER — TELEPHONE (OUTPATIENT)
Dept: FAMILY MEDICINE | Facility: CLINIC | Age: 88
End: 2023-01-26
Payer: MEDICARE

## 2023-01-26 NOTE — TELEPHONE ENCOUNTER
----- Message from Adithya Valerio MD sent at 1/26/2023  5:24 AM CST -----  Regarding: Appointment  Please check to see if patient still needs an appointment.  Patient should have blood work performed prior.  ----- Message -----  From: Adithya Valerio MD  Sent: 1/26/2023  12:00 AM CST  To: Adithya Valerio MD

## 2023-02-01 ENCOUNTER — TELEPHONE (OUTPATIENT)
Dept: FAMILY MEDICINE | Facility: CLINIC | Age: 88
End: 2023-02-01
Payer: MEDICARE

## 2023-02-04 PROCEDURE — G0179 MD RECERTIFICATION HHA PT: HCPCS | Mod: ,,, | Performed by: FAMILY MEDICINE

## 2023-02-04 PROCEDURE — G0179 PR HOME HEALTH MD RECERTIFICATION: ICD-10-PCS | Mod: ,,, | Performed by: FAMILY MEDICINE

## 2023-02-27 ENCOUNTER — EXTERNAL HOME HEALTH (OUTPATIENT)
Dept: HOME HEALTH SERVICES | Facility: HOSPITAL | Age: 88
End: 2023-02-27
Payer: MEDICARE

## 2023-02-28 ENCOUNTER — DOCUMENT SCAN (OUTPATIENT)
Dept: HOME HEALTH SERVICES | Facility: HOSPITAL | Age: 88
End: 2023-02-28
Payer: MEDICARE

## 2023-03-31 ENCOUNTER — TELEPHONE (OUTPATIENT)
Dept: FAMILY MEDICINE | Facility: CLINIC | Age: 88
End: 2023-03-31
Payer: MEDICARE

## 2023-03-31 NOTE — TELEPHONE ENCOUNTER
Please find out what symptoms the patient is having?  Urinalysis is showing dehydration but no definite infection.  No bacteria on urinalysis.

## 2023-03-31 NOTE — TELEPHONE ENCOUNTER
----- Message from Boo Rodriguez sent at 3/31/2023  8:55 AM CDT -----  Contact: [facility]  Griselda the nurse at [facility] is asking for an return call in reference to seeing how to send over pt urinalysis results , please call back at [phone] Thx CJ

## 2023-03-31 NOTE — TELEPHONE ENCOUNTER
Increase hydration with water.  Follow-up with me sooner if still having symptoms.  She will need a urine culture if continuing to have symptoms.

## 2023-03-31 NOTE — TELEPHONE ENCOUNTER
"Pt states " during the weekend I was having urinary  frequency and I been feeling  dehydrated , I will be drinking the electrolyte drinks"   "

## 2023-03-31 NOTE — TELEPHONE ENCOUNTER
Pt urine test results    [facility]  Outside Information       suggestion  Information displayed in this report may not trend or trigger automated decision support.      Contains abnormal data Urinalysis with Reflex  Order: [id number]   Ref Range & Units 1 d ago   Urine type  CCMS    Color, Urine  DARK YELLOW    Appearance  CLOUDY    Glucose, Urine NEGATIVE mg/dL NEGATIVE    Bilirubin, Urine NEGATIVE mg/dL NEGATIVE    Ketones, Urine NEGATIVE mg/dL TRACE Abnormal     Specific Gravity, Urine 1.005 - 1.030 1.025    Blood, Urine NEGATIVE NEGATIVE    pH, Urine 4.5 - 8.0 5.5    Protein, Urine NEGATIVE mg/dL TRACE    Urobilinogen 0.2 - 1.0 [Nam'U]/dL 0.2    Nitrite, Urine NEGATIVE NEGATIVE    Leuk. Esterase, Urine NEGATIVE TRACE Abnormal     RBC, Urine 0 - 4 [#]/[HPF] 1    WBC, Urine 0 - 5 [#]/[HPF] 11 High     Epithelial Cells, Urine 0 - 5 [#]/[HPF] 9 High     Casts, Urine 0 - 5 [#]/[LPF] 4    Bacteria, Urine NONE SEEN [#]/[HPF] NONE    Resulting Agency  [facility]   Specimen Collected: 03/30/23 16:56 Last Resulted: 03/30/23 17:09   Received From: [facility]  Result Received: 03/31/23 13:47

## 2023-03-31 NOTE — TELEPHONE ENCOUNTER
----- Message from Shraddha Cardoza sent at 3/31/2023  4:12 PM CDT -----  Contact: Laura  .Type:  Patient Returning Call    Who Called:Laura  Who Left Message for Patient:nurse  Does the patient know what this is regarding?:unknown   Would the patient rather a call back or a response via [facility]? Call   Best Call Back Number:.[phone]   Additional Information: PT returning a call

## 2023-04-05 PROCEDURE — G0179 PR HOME HEALTH MD RECERTIFICATION: ICD-10-PCS | Mod: ,,, | Performed by: FAMILY MEDICINE

## 2023-04-05 PROCEDURE — G0179 MD RECERTIFICATION HHA PT: HCPCS | Mod: ,,, | Performed by: FAMILY MEDICINE

## 2023-05-23 ENCOUNTER — TELEPHONE (OUTPATIENT)
Dept: FAMILY MEDICINE | Facility: CLINIC | Age: 88
End: 2023-05-23
Payer: MEDICARE

## 2023-05-23 DIAGNOSIS — R30.0 DYSURIA: Primary | ICD-10-CM

## 2023-05-23 NOTE — TELEPHONE ENCOUNTER
----- Message from Shraddha Cardoza sent at 5/23/2023  9:04 AM CDT -----  Contact: Mammkaylee/ [facility]  Mammkaylee with [facility] is calling to speak with the nurse regarding sample. Reports needing a urine sample from the patient due to the patient having early signs of UTI. Please give Memo a call back  at [phone].

## 2023-05-23 NOTE — TELEPHONE ENCOUNTER
I have signed for the following orders AND/OR meds.  Please call the patient and ask the patient to schedule the testing AND/OR inform about any medications that were sent.      Orders Placed This Encounter   Procedures    Urinalysis, Reflex to Urine Culture Urine, Clean Catch     Standing Status:   Future     Standing Expiration Date:   11/23/2024     Order Specific Question:   Preferred Collection Type     Answer:   Urine, Clean Catch     Order Specific Question:   Specimen Source     Answer:   Urine

## 2023-05-23 NOTE — TELEPHONE ENCOUNTER
Called Memo to let her know orders were placed, stated she went through Dr. Nicholas office since that is her Urologist

## 2023-05-28 DIAGNOSIS — I10 ESSENTIAL HYPERTENSION: ICD-10-CM

## 2023-05-28 DIAGNOSIS — Z79.899 ENCOUNTER FOR LONG-TERM (CURRENT) USE OF MEDICATIONS: ICD-10-CM

## 2023-05-28 DIAGNOSIS — E03.8 OTHER SPECIFIED HYPOTHYROIDISM: ICD-10-CM

## 2023-05-28 NOTE — TELEPHONE ENCOUNTER
Care Due:                  Date            Visit Type   Department     Provider  --------------------------------------------------------------------------------                                EP -                              PRIMARY      [facility] FAMILY  Last Visit: 09-      CARE ([facility])   MEDICINE       Adithya Valerio  Next Visit: None Scheduled  None         None Found                                                            Last  Test          Frequency    Reason                     Performed    Due Date  --------------------------------------------------------------------------------    CMP.........  12 months..  hydroCHLOROthiazide......  03- 03-    [facility] Embedded Care Due Messages. Reference number: [id number].   5/28/2023 4:03:00 AM CDT

## 2023-05-29 NOTE — TELEPHONE ENCOUNTER
Refill Routing Note   Medication(s) are not appropriate for processing by [facility] for the following reason(s):      Required labs outdated    [facility] action(s):  Defer Labs due          Appointments  past 12m or future 3m with PCP    Date Provider   Last Visit   9/9/2022 Adithya Valerio MD   Next Visit   Visit date not found Adithya Valerio MD   ED visits in past 90 days: 0        Note composed:11:33 AM 05/29/2023

## 2023-05-30 RX ORDER — HYDROCHLOROTHIAZIDE 12.5 MG/1
TABLET ORAL
Qty: 90 TABLET | Refills: 3 | Status: SHIPPED | OUTPATIENT
Start: 2023-05-30

## 2023-05-30 RX ORDER — LEVOTHYROXINE SODIUM 175 UG/1
TABLET ORAL
Qty: 90 TABLET | Refills: 3 | Status: SHIPPED | OUTPATIENT
Start: 2023-05-30

## 2023-05-31 ENCOUNTER — EXTERNAL HOME HEALTH (OUTPATIENT)
Dept: HOME HEALTH SERVICES | Facility: HOSPITAL | Age: 88
End: 2023-05-31
Payer: MEDICARE

## 2023-06-04 PROCEDURE — G0179 PR HOME HEALTH MD RECERTIFICATION: ICD-10-PCS | Mod: ,,, | Performed by: FAMILY MEDICINE

## 2023-06-04 PROCEDURE — G0179 MD RECERTIFICATION HHA PT: HCPCS | Mod: ,,, | Performed by: FAMILY MEDICINE

## 2023-06-15 ENCOUNTER — EXTERNAL HOME HEALTH (OUTPATIENT)
Dept: HOME HEALTH SERVICES | Facility: HOSPITAL | Age: 88
End: 2023-06-15
Payer: MEDICARE

## 2023-06-19 DIAGNOSIS — Z79.899 ENCOUNTER FOR LONG-TERM (CURRENT) USE OF MEDICATIONS: ICD-10-CM

## 2023-06-19 DIAGNOSIS — I10 ESSENTIAL HYPERTENSION: ICD-10-CM

## 2023-06-19 DIAGNOSIS — Z79.899 LONG TERM CURRENT USE OF DIURETIC: ICD-10-CM

## 2023-06-19 NOTE — TELEPHONE ENCOUNTER
Care Due:                  Date            Visit Type   Department     Provider  --------------------------------------------------------------------------------                                EP -                              PRIMARY      [facility] FAMILY  Last Visit: 09-      CARE ([facility])   MEDICINE       Adithya Valerio  Next Visit: None Scheduled  None         None Found                                                            Last  Test          Frequency    Reason                     Performed    Due Date  --------------------------------------------------------------------------------    Office Visit  12 months..  EScitalopram.............  09- 09-    [facility] Embedded Care Due Messages. Reference number: [id number].   6/19/2023 2:37:41 PM CDT

## 2023-06-20 RX ORDER — POTASSIUM CHLORIDE 20 MEQ/1
TABLET, EXTENDED RELEASE ORAL
Qty: 90 TABLET | Refills: 0 | Status: SHIPPED | OUTPATIENT
Start: 2023-06-20

## 2023-06-20 NOTE — TELEPHONE ENCOUNTER
Refill Routing Note   Medication(s) are not appropriate for processing by [facility] for the following reason(s):      Required labs outdated    [facility] action(s):  Defer Appointment due            Appointments  past 12m or future 3m with PCP    Date Provider   Last Visit   9/9/2022 Adithya Valerio MD   Next Visit   Visit date not found Adithya Valerio MD   ED visits in past 90 days: 0        Note composed:3:35 AM 06/20/2023

## 2023-06-27 ENCOUNTER — DOCUMENT SCAN (OUTPATIENT)
Dept: HOME HEALTH SERVICES | Facility: HOSPITAL | Age: 88
End: 2023-06-27
Payer: MEDICARE

## 2023-06-28 ENCOUNTER — DOCUMENT SCAN (OUTPATIENT)
Dept: HOME HEALTH SERVICES | Facility: HOSPITAL | Age: 88
End: 2023-06-28
Payer: MEDICARE

## 2023-08-08 NOTE — TELEPHONE ENCOUNTER
----- Message from Evelin Morocho sent at 12/20/2019  4:11 PM CST -----  Contact: Ca daughter  Calling concerning a RX for patient that need medication. States they have not heard back from her neurologist to get medication. Please call daughter @ [phone]. Thanks, abel      [id number] - [place] - [address] [place]  [address]  [place] [address]  Phone: [phone] Fax: [phone]       No

## 2023-09-20 DIAGNOSIS — K21.9 GASTROESOPHAGEAL REFLUX DISEASE, UNSPECIFIED WHETHER ESOPHAGITIS PRESENT: Chronic | ICD-10-CM

## 2023-09-20 DIAGNOSIS — Z79.899 ENCOUNTER FOR LONG-TERM (CURRENT) USE OF MEDICATIONS: ICD-10-CM

## 2023-09-20 RX ORDER — PANTOPRAZOLE SODIUM 40 MG/1
TABLET, DELAYED RELEASE ORAL
Qty: 90 TABLET | Refills: 0 | Status: SHIPPED | OUTPATIENT
Start: 2023-09-20

## 2023-09-20 NOTE — TELEPHONE ENCOUNTER
Refill Decision Note   Laura Yu  is requesting a refill authorization.  Brief Assessment and Rationale for Refill:  Approve     Medication Therapy Plan:         Comments:     Note composed:5:01 AM 09/20/2023             Appointments     Last Visit   9/9/2022 Adithya Valerio MD   Next Visit   Visit date not found Adithya Valerio MD

## 2023-09-20 NOTE — TELEPHONE ENCOUNTER
No care due was identified.  [facility] Embedded Care Due Messages. Reference number: [id number].   9/20/2023 3:42:49 AM CDT

## 2024-01-15 DIAGNOSIS — Z79.899 ENCOUNTER FOR LONG-TERM (CURRENT) USE OF MEDICATIONS: ICD-10-CM

## 2024-01-16 RX ORDER — MONTELUKAST SODIUM 10 MG/1
TABLET ORAL
Qty: 90 TABLET | Refills: 3 | Status: SHIPPED | OUTPATIENT
Start: 2024-01-16

## 2024-01-16 NOTE — TELEPHONE ENCOUNTER
Refill Routing Note   Medication(s) are not appropriate for processing by [facility] for the following reason(s):        Patient not seen by provider within 15 months    [facility] action(s):  Defer   Requires appointment : Yes     Requires labs : Yes             Appointments  past 12m or future 3m with PCP    Date Provider   Last Visit   9/9/2022 Adithya Valerio MD   Next Visit   Visit date not found Adithya Valerio MD   ED visits in past 90 days: 0        Note composed:10:58 AM 01/16/2024

## 2024-01-16 NOTE — TELEPHONE ENCOUNTER
Care Due:                  Date            Visit Type   Department     Provider  --------------------------------------------------------------------------------                                EP -                              PRIMARY      [facility] FAMILY  Last Visit: 09-      CARE ([facility])   MEDICINE       Adithya Valerio  Next Visit: None Scheduled  None         None Found                                                            Last  Test          Frequency    Reason                     Performed    Due Date  --------------------------------------------------------------------------------    Office Visit  12 months..  EScitalopram,              09- 09-                             alendronate, montelukast.    CMP.........  12 months..  alendronate..............  03- 03-    Mg Level....  12 months..  alendronate..............  Not Found    Overdue    Phosphate...  12 months..  alendronate..............  Not Found    Overdue    Vitamin D...  12 months..  alendronate..............  Not Found    Overdue    [employer] Embedded Care Due Messages. Reference number: [id number].   1/15/2024 9:20:07 PM CST

## 2024-02-10 DIAGNOSIS — Z79.899 ENCOUNTER FOR LONG-TERM (CURRENT) USE OF MEDICATIONS: ICD-10-CM

## 2024-02-10 DIAGNOSIS — Z86.711 HX PULMONARY EMBOLISM: Chronic | ICD-10-CM

## 2024-02-11 DIAGNOSIS — Z79.899 ENCOUNTER FOR LONG-TERM (CURRENT) USE OF MEDICATIONS: ICD-10-CM

## 2024-02-11 DIAGNOSIS — M80.00XD AGE-RELATED OSTEOPOROSIS WITH CURRENT PATHOLOGICAL FRACTURE WITH ROUTINE HEALING: Chronic | ICD-10-CM

## 2024-02-11 RX ORDER — ALENDRONATE SODIUM 70 MG/1
TABLET ORAL
Qty: 12 TABLET | Refills: 0 | Status: SHIPPED | OUTPATIENT
Start: 2024-02-11 | End: 2024-06-09 | Stop reason: SDUPTHER

## 2024-02-11 RX ORDER — RIVAROXABAN 20 MG/1
TABLET, FILM COATED ORAL
Qty: 90 TABLET | Refills: 1 | Status: SHIPPED | OUTPATIENT
Start: 2024-02-11

## 2024-02-11 NOTE — TELEPHONE ENCOUNTER
No care due was identified.  [facility] Embedded Care Due Messages. Reference number: [id number].   2/11/2024 3:38:22 AM CST

## 2024-06-09 DIAGNOSIS — M80.00XD AGE-RELATED OSTEOPOROSIS WITH CURRENT PATHOLOGICAL FRACTURE WITH ROUTINE HEALING: Chronic | ICD-10-CM

## 2024-06-09 DIAGNOSIS — Z79.899 ENCOUNTER FOR LONG-TERM (CURRENT) USE OF MEDICATIONS: ICD-10-CM

## 2024-06-09 RX ORDER — ALENDRONATE SODIUM 70 MG/1
TABLET ORAL
Qty: 12 TABLET | Refills: 3 | Status: SHIPPED | OUTPATIENT
Start: 2024-06-09

## 2024-06-09 NOTE — TELEPHONE ENCOUNTER
Refill Routing Note   Medication(s) are not appropriate for processing by [facility] for the following reason(s):        Outside of protocol  Responsible provider unclear    [facility] action(s):  Route             Appointments  past 12m or future 3m with PCP    Date Provider   Last Visit   9/9/2022 Adithya Valerio MD   Next Visit   Visit date not found Adithya Valerio MD   ED visits in past 90 days: 0        Note composed:3:24 AM 06/09/2024

## 2024-07-19 NOTE — PROGRESS NOTES
PLAN:      Problem List Items Addressed This Visit       Recurrent major depressive disorder, in full remission (Chronic)     Increase Lexapro 10 milligrams daily.Please be advised of condition course.  SNRI/SSRI is first-line treatment for this condition.  Please be advised of the risk of discontinuing this medication without tapering/contacting MD.  Patient has been advised of side effects, and all questions were answered.  Patient voiced understanding.  Patient will follow up routinely and notify us if having any side effects or worsening or persistent symptoms.  ER precautions were given. Antidepressant/Antianxiety Medication Initiation:  Patient informed of risks, benefits, and potential side effects of medication and accepts informed consent.  Common side effects include nausea, fatigue, headache, insomnia, etc see medication insert for complete side effect profile.  Most importantly be advised of the possibility of new or worsening suicidal thoughts/depression/anxiety etcetera.  Please be advised to stop the medication immediately and seek urgent treatment if this occurs.  Therefore please do not to abruptly discontinue medication without physician guidance except in cases of sudden onset or worsening of SI.            Relevant Medications    EScitalopram oxalate (LEXAPRO) 10 MG tablet    General weakness (Chronic)     Referral to home health for PT OT skilled nursing insert eval and treat.  Patient also with dehydration.  Would like to get patient IV fluids at home as needed for dehydration.         Relevant Medications    0.9 % sodium chloride (SODIUM CHLORIDE 0.9%) solution    Other Relevant Orders    Ambulatory referral/consult to Urology    SUBSEQUENT HOME HEALTH ORDERS    Urinary tract infection with hematuria - Primary     Patient was treated with Macrobid.  Repeat urinalysis shows UTI has cleared.  Patient previously followed urology Dr. Jose Rosas.  She will follow-up with Urology for further  Pt has been given PO Challenge of saltine crackers, to be given tylenol after testing tolerance as pt vomiting after receiving tylenol earlier.   evaluation.         E-coli UTI    Relevant Medications    0.9 % sodium chloride (SODIUM CHLORIDE 0.9%) solution    Dehydration     Increase hydration with water.  Avoid anti-inflammatory medications.         Relevant Medications    0.9 % sodium chloride (SODIUM CHLORIDE 0.9%) solution    Other Relevant Orders    Ambulatory referral/consult to Urology    SUBSEQUENT HOME HEALTH ORDERS    Nausea     Refill Zofran.  Patient prefers oral tablet.         Relevant Medications    ondansetron (ZOFRAN) 4 MG tablet          Medication Management for assessment above:   Medication List with Changes/Refills   New Medications    0.9 % SODIUM CHLORIDE (SODIUM CHLORIDE 0.9%) SOLUTION    Provide IV kit with each liter of normal saline.  Home health nurse to infuse 1 liter of normal saline daily as needed for dehydration.    ONDANSETRON (ZOFRAN) 4 MG TABLET    Take 1 tablet (4 mg total) by mouth every 8 (eight) hours as needed for Nausea.   Current Medications    ALENDRONATE (FOSAMAX) 70 MG TABLET    TAKE 1 TABLET BY MOUTH  WEEKLY WITH 8 OZ OF PLAIN  WATER 30 MINUTES BEFORE  FIRST FOOD, DRINK OR MEDS.  STAY UPRIGHT FOR 30 MINS    ASCORBIC ACID,SOD/ZINC OX,GLUC (ZINC AND C ORAL)    Take by mouth.    CETIRIZINE (ZYRTEC) 10 MG TABLET    Take 1 tablet (10 mg total) by mouth once daily.    CYANOCOBALAMIN (VITAMIN B-12) 1000 MCG TABLET    Take 1 tablet (1,000 mcg total) by mouth once daily.    CYCLOBENZAPRINE (FLEXERIL) 5 MG TABLET    Take 1 tablet (5 mg total) by mouth nightly as needed for Muscle spasms.    DICLOFENAC SODIUM (VOLTAREN) 1 % GEL    Apply 2 g topically once daily. Apply to knees    HYDROCHLOROTHIAZIDE (HYDRODIURIL) 12.5 MG TAB    Take 1 tablet (12.5 mg total) by mouth once daily.    LEVOTHYROXINE (SYNTHROID, LEVOTHROID) 175 MCG TABLET    Take 1 tablet (175 mcg total) by mouth once daily.    METOPROLOL SUCCINATE (TOPROL-XL) 25 MG 24 HR TABLET    Take 1 tablet (25 mg total) by mouth once daily.    MONTELUKAST (SINGULAIR) 10  MG TABLET    Take 1 tablet (10 mg total) by mouth once daily.    MULTIVITAMIN (THERAGRAN) PER TABLET    Take 1 tablet by mouth once daily.    NEOMYCIN-POLYMYXIN-DEXAMETHASONE (MAXITROL) 3.5MG/ML-10,000 UNIT/ML-0.1 % DRPS    INSTILL 1 DROP IN BOTH EYES THREE TIMES A DAY    NIACIN 500 MG TAB    Take 500 mg by mouth 2 (two) times daily.    ONDANSETRON (ZOFRAN-ODT) 4 MG TBDL    Take 1 tablet (4 mg total) by mouth every 6 (six) hours as needed (nausea).    PANTOPRAZOLE (PROTONIX) 40 MG TABLET    Take 1 tablet (40 mg total) by mouth once daily.    POTASSIUM CHLORIDE SA (K-DUR,KLOR-CON) 20 MEQ TABLET    Take 1 tablet (20 mEq total) by mouth once daily.    RIVAROXABAN (XARELTO) 20 MG TAB    Take 1 tablet (20 mg total) by mouth once daily.    VIT C,Q-LT-BPNFH-LUTEIN-ZEAXAN (PRESERVISION AREDS 2) 796-666-73-1 MG-UNIT-MG-MG CAP    Take by mouth.    VITAMIN D (VITAMIN D3) 1000 UNITS TAB    Take 5,000 Units by mouth once daily.   Changed and/or Refilled Medications    Modified Medication Previous Medication    ESCITALOPRAM OXALATE (LEXAPRO) 10 MG TABLET EScitalopram oxalate (LEXAPRO) 5 MG Tab       Take 1 tablet (10 mg total) by mouth once daily.    Take 1 tablet (5 mg total) by mouth once daily.   Discontinued Medications    NITROFURANTOIN, MACROCRYSTAL-MONOHYDRATE, (MACROBID) 100 MG CAPSULE    Take 1 capsule (100 mg total) by mouth 2 (two) times daily. for 10 days       Adithya Valerio M.D.  ==========================================================================  Subjective:   Patient ID: Laura Yu is a 87 y.o. female.  has a past medical history of Age-related osteoporosis with current pathological fracture with routine healing (3/20/2019), Chronic allergic rhinitis (2/16/2017), Closed fracture of left tibial plateau (6/20/2019), Cyst of skin (2/18/2020), Deep vein thrombosis, Disorder of kidney and ureter, Essential hypertension (11/14/2019), Generalized osteoarthritis (6/5/2013), GERD (gastroesophageal  reflux disease) (11/14/2019), pulmonary embolism (4/3/2014), Macrocytic anemia (1/21/2021), Macular degeneration, Mixed hyperlipidemia (11/14/2019), Mixed stress and urge urinary incontinence (8/12/2014), Obstructive sleep apnea of adult (4/15/2019), Other specified hypothyroidism (6/5/2013), Paroxysmal atrial fibrillation (11/14/2019), Reactive airway disease (2/16/2017), S/P IVC filter, Thrombocytopenia (8/6/2020), and Vitamin D deficiency (11/14/2019).   Chief Complaint: Urinary Tract Infection      Problem List Items Addressed This Visit       Recurrent major depressive disorder, in full remission (Chronic)    Overview     Chronic.  September 2022: Patient reports not doing well on Lexapro 5 milligrams daily.  She tried to come off of the medication with worsening symptoms.  Patient would like to increase to 10 milligrams.  Denies any SI HI hallucinations.  Previous history:  Recurrent.  Currently having some depressed mood due to not being able to get out participate in activities with her [employer] daughter association.  Patient denies any SI HI hallucinations.  She is agreeable to starting medication.         Current Assessment & Plan     Increase Lexapro 10 milligrams daily.Please be advised of condition course.  SNRI/SSRI is first-line treatment for this condition.  Please be advised of the risk of discontinuing this medication without tapering/contacting MD.  Patient has been advised of side effects, and all questions were answered.  Patient voiced understanding.  Patient will follow up routinely and notify us if having any side effects or worsening or persistent symptoms.  ER precautions were given. Antidepressant/Antianxiety Medication Initiation:  Patient informed of risks, benefits, and potential side effects of medication and accepts informed consent.  Common side effects include nausea, fatigue, headache, insomnia, etc see medication insert for complete side effect profile.  Most importantly be  advised of the possibility of new or worsening suicidal thoughts/depression/anxiety etcetera.  Please be advised to stop the medication immediately and seek urgent treatment if this occurs.  Therefore please do not to abruptly discontinue medication without physician guidance except in cases of sudden onset or worsening of SI.            General weakness (Chronic)    Overview     Chronic.  September 2022: Patient doing well and better with therapy.  However she had UTI and dehydration which set her back.  Patient with increased weakness having difficulty ambulating and completing ADLs.    Previous history:  Worsening.  Patient reports that she has been more inactive lately due to being depressed in staying at home due to COVID.  She is using a walker today.         Current Assessment & Plan     Referral to home health for PT OT skilled nursing insert eval and treat.  Patient also with dehydration.  Would like to get patient IV fluids at home as needed for dehydration.         Urinary tract infection with hematuria - Primary    Overview     Component 3 wk ago   Micro Culture Result  >100,000 CFU/mL    Micro Culture Result  This is confirmed to be an extended spectrum betalactamase    Micro Culture Result  .    Urine Culture Result  Escherichia coli Abnormal     Micro Culture Result  >100,000 CFU/mL   This is confirmed to be an extended spectrum betalactamase   .    Resulting Agency [facility]   Susceptibility    Organism Antibiotic Method Susceptibility   Escherichia coli Nitrofurantoin MINIMAL INHIBITORY CONCENTRATION <=32 ug/mL: Sensitive   Escherichia coli Ampicillin MINIMAL INHIBITORY CONCENTRATION >16 ug/mL: Resistant   Escherichia coli Amp/Sulbactam MINIMAL INHIBITORY CONCENTRATION >16/8 ug/mL: Resistant   Escherichia coli Piperacillin/Tazo MINIMAL INHIBITORY CONCENTRATION <=16 ug/mL: Sensitive   Escherichia coli Cefazolin MINIMAL INHIBITORY CONCENTRATION >16 ug/mL: Resistant   Escherichia coli  Gentamicin MINIMAL INHIBITORY CONCENTRATION <=4 ug/mL: Sensitive   Escherichia coli Levofloxacin MINIMAL INHIBITORY CONCENTRATION >4 ug/mL: Resistant   Escherichia coli Cefepime MINIMAL INHIBITORY CONCENTRATION >16 ug/mL: Resistant   Escherichia coli Ciprofloxacin MINIMAL INHIBITORY CONCENTRATION >2 ug/mL: Resistant   Escherichia coli Meropenem MINIMAL INHIBITORY CONCENTRATION <=1 ug/mL: Sensitive   Escherichia coli Ceftriaxone MINIMAL INHIBITORY CONCENTRATION >32 ug/mL: Resistant   Escherichia coli Trimethoprim + Sulfamethoxazole MINIMAL INHIBITORY CONCENTRATION <=2/38 ug/mL: Sensitive   Specimen Collected: 08/19/22 10:40 Last Resulted: 08/23/22 12:11              Current Assessment & Plan     Patient was treated with Macrobid.  Repeat urinalysis shows UTI has cleared.  Patient previously followed urology Dr. Jose Rosas.  She will follow-up with Urology for further evaluation.         E-coli UTI    Overview     Showing resistance.  See UTI.         Dehydration    Current Assessment & Plan     Increase hydration with water.  Avoid anti-inflammatory medications.         Nausea    Overview     Associated with UTI.         Current Assessment & Plan     Refill Zofran.  Patient prefers oral tablet.             Review of patient's allergies indicates:   Allergen Reactions    Hydrocodone-acetaminophen     Iodine and iodide containing products     Meperidine     Morphine     Penicillins     Sulfa (sulfonamide antibiotics)     Macrobid [nitrofurantoin monohyd/m-cryst] Nausea And Vomiting     Current Outpatient Medications   Medication Instructions    0.9 % sodium chloride (SODIUM CHLORIDE 0.9%) solution Provide IV kit with each liter of normal saline.  Home health nurse to infuse 1 liter of normal saline daily as needed for dehydration.    alendronate (FOSAMAX) 70 MG tablet TAKE 1 TABLET BY MOUTH  WEEKLY WITH 8 OZ OF PLAIN  WATER 30 MINUTES BEFORE  FIRST FOOD, DRINK OR MEDS.  STAY UPRIGHT FOR 30 MINS    ascorbic acid,sod/zinc  ox,gluc (ZINC AND C ORAL) Oral    cetirizine (ZYRTEC) 10 mg, Oral, Daily    cyanocobalamin (VITAMIN B-12) 1,000 mcg, Oral, Daily    cyclobenzaprine (FLEXERIL) 5 mg, Oral, Nightly PRN    diclofenac sodium (VOLTAREN) 2 g, Topical (Top), Daily, Apply to knees    EScitalopram oxalate (LEXAPRO) 10 mg, Oral, Daily    hydroCHLOROthiazide (HYDRODIURIL) 12.5 mg, Oral, Daily    levothyroxine (SYNTHROID, LEVOTHROID) 175 mcg, Oral, Daily    metoprolol succinate (TOPROL-XL) 25 mg, Oral, Daily    montelukast (SINGULAIR) 10 mg, Oral, Daily    multivitamin (THERAGRAN) per tablet 1 tablet, Oral, Daily    neomycin-polymyxin-dexamethasone (MAXITROL) 3.5mg/mL-10,000 unit/mL-0.1 % DrpS INSTILL 1 DROP IN BOTH EYES THREE TIMES A DAY    niacin 500 mg, Oral, 2 times daily    ondansetron (ZOFRAN) 4 mg, Oral, Every 8 hours PRN    ondansetron (ZOFRAN-ODT) 4 mg, Oral, Every 6 hours PRN    pantoprazole (PROTONIX) 40 mg, Oral, Daily    potassium chloride SA (K-DUR,KLOR-CON) 20 MEQ tablet 20 mEq, Oral, Daily    rivaroxaban (XARELTO) 20 mg, Oral, Daily    vit C,D-Ci-emgvm-lutein-zeaxan (PRESERVISION AREDS 2) 223-346-56-1 mg-unit-mg-mg Cap Oral    vitamin D (VITAMIN D3) 5,000 Units, Oral, Daily      I have reviewed the PMH, social history, FamilyHx, surgical history, allergies and medications documented / confirmed by the patient at the time of this visit.  Review of Systems   Constitutional:  Positive for activity change (less mobile due to back pain and weakness) and fatigue. Negative for chills, fever and unexpected weight change.   HENT: Negative.     Eyes: Negative.    Respiratory:  Negative for cough and shortness of breath.    Cardiovascular:  Positive for leg swelling (chronic). Negative for chest pain and palpitations.   Gastrointestinal:  Negative for abdominal pain and diarrhea.   Endocrine: Negative.    Genitourinary:  Positive for dysuria. Negative for hematuria.   Musculoskeletal:  Positive for arthralgias, back pain, gait problem and  "myalgias.   Skin: Negative.    Allergic/Immunologic: Negative.    Neurological:  Positive for weakness (generalized) and numbness (sciatica).   Hematological:  Negative for adenopathy. Does not bruise/bleed easily.   Psychiatric/Behavioral:  Positive for decreased concentration and dysphoric mood.    Objective:   /78   Pulse 76   Temp 96.6 °F (35.9 °C) (Other (see comments))   Ht 5' 4" (1.626 m)   Wt 113.4 kg (250 lb)   SpO2 98%   BMI 42.91 kg/m²   Physical Exam  Vitals and nursing note reviewed.   Constitutional:       General: She is not in acute distress.     Appearance: She is well-developed. She is not ill-appearing, toxic-appearing or diaphoretic.      Comments: Here with daughter.  using rolling walker.   HENT:      Head: Normocephalic and atraumatic.      Right Ear: Hearing and external ear normal.      Left Ear: Hearing and external ear normal.   Eyes:      General: Lids are normal.      Conjunctiva/sclera: Conjunctivae normal.   Cardiovascular:      Rate and Rhythm: Normal rate and regular rhythm.      Heart sounds: Normal heart sounds. No murmur heard.  Pulmonary:      Effort: Pulmonary effort is normal. No respiratory distress.      Breath sounds: Normal breath sounds.   Abdominal:      General: Bowel sounds are normal. There is no distension.      Palpations: Abdomen is soft.      Tenderness: There is no abdominal tenderness. There is no right CVA tenderness, left CVA tenderness, guarding or rebound.   Musculoskeletal:         General: Tenderness and deformity present. Normal range of motion.      Cervical back: Normal range of motion.      Right lower leg: Edema present.      Left lower leg: Edema present.   Lymphadenopathy:      Cervical: No cervical adenopathy.   Skin:     General: Skin is warm and dry.      Capillary Refill: Capillary refill takes less than 2 seconds.      Coloration: Skin is not pale.   Neurological:      Mental Status: She is alert and oriented to person, place, and " time. Mental status is at baseline. She is not disoriented.      Cranial Nerves: No cranial nerve deficit.      Sensory: No sensory deficit.      Motor: No weakness.      Gait: Gait normal.   Psychiatric:         Attention and Perception: She is attentive. She does not perceive auditory or visual hallucinations.         Mood and Affect: Mood is depressed. Mood is not anxious.         Speech: Speech is not rapid and pressured or slurred.         Behavior: Behavior normal. Behavior is not agitated, aggressive or hyperactive. Behavior is cooperative.         Thought Content: Thought content normal. Thought content is not paranoid or delusional. Thought content does not include homicidal or suicidal ideation. Thought content does not include homicidal or suicidal plan.         Cognition and Memory: Memory is impaired.         Judgment: Judgment normal.      Patient is wearing a mask which limits physical exam due to COVID restrictions.   Assessment:     1. Urinary tract infection with hematuria, site unspecified    2. E-coli UTI    3. General weakness    4. Dehydration    5. Nausea    6. Recurrent major depressive disorder, in full remission      MDM:   Moderate complexity.  Moderate risk.  Total time:  32 minutes.  This includes total time spent on the encounter, which includes face to face time and non-face to face time preparing to see the patient (eg, review of previous medical records, tests), Obtaining and/or reviewing separately obtained history, documenting clinical information in the electronic or other health record, independently interpreting results (not separately reported)/communicating results to the patient/family/caregiver, and/or care coordination (not separately reported).    I have Reviewed and summarized old records.  I have performed thorough medication reconciliation today and discussed risk and benefits of medications.  I have reviewed labs and discussed with patient.  All questions were  answered.  I am requesting old records and will review them once they are available.  [facility]        I have signed for the following orders AND/OR meds.  Orders Placed This Encounter   Procedures    Ambulatory referral/consult to Urology     Standing Status:   Future     Standing Expiration Date:   10/9/2023     Referral Priority:   Urgent     Referral Type:   Consultation     Referral Reason:   Specialty Services Required     Referred to Provider:   Jose Rosas MD     Requested Specialty:   Urology     Number of Visits Requested:   1    SUBSEQUENT HOME HEALTH ORDERS     Subsequent Home Health Orders    Current Medications:  Current Outpatient Medications:  alendronate (FOSAMAX) 70 MG tablet, TAKE 1 TABLET BY MOUTH  WEEKLY WITH 8 OZ OF PLAIN  WATER 30 MINUTES BEFORE  FIRST FOOD, DRINK OR MEDS.  STAY UPRIGHT FOR 30 MINS, Disp: 12 tablet, Rfl: 4  ascorbic acid,sod/zinc ox,gluc (ZINC AND C ORAL), Take by mouth., Disp: , Rfl:   cetirizine (ZYRTEC) 10 MG tablet, Take 1 tablet (10 mg total) by mouth once daily., Disp: 90 tablet, Rfl: 1  cyanocobalamin (VITAMIN B-12) 1000 MCG tablet, Take 1 tablet (1,000 mcg total) by mouth once daily., Disp: 90 tablet, Rfl: 1  cyclobenzaprine (FLEXERIL) 5 MG tablet, Take 1 tablet (5 mg total) by mouth nightly as needed for Muscle spasms., Disp: 30 tablet, Rfl: 0  EScitalopram oxalate (LEXAPRO) 5 MG Tab, Take 1 tablet (5 mg total) by mouth once daily., Disp: 90 tablet, Rfl: 4  hydroCHLOROthiazide (HYDRODIURIL) 12.5 MG Tab, Take 1 tablet (12.5 mg total) by mouth once daily., Disp: 90 tablet, Rfl: 4  levothyroxine (SYNTHROID, LEVOTHROID) 175 MCG tablet, Take 1 tablet (175 mcg total) by mouth once daily., Disp: 90 tablet, Rfl: 4  metoprolol succinate (TOPROL-XL) 25 MG 24 hr tablet, Take 1 tablet (25 mg total) by mouth once daily., Disp: 90 tablet, Rfl: 4  montelukast (SINGULAIR) 10 mg tablet, Take 1 tablet (10 mg total) by mouth once daily., Disp: 90 tablet, Rfl: 4  multivitamin  (THERAGRAN) per tablet, Take 1 tablet by mouth once daily., Disp: , Rfl:   niacin 500 MG Tab, Take 500 mg by mouth 2 (two) times daily., Disp: , Rfl:   ondansetron (ZOFRAN-ODT) 4 MG TbDL, Take 1 tablet (4 mg total) by mouth every 6 (six) hours as needed (nausea)., Disp: 30 tablet, Rfl: 0  pantoprazole (PROTONIX) 40 MG tablet, Take 1 tablet (40 mg total) by mouth once daily., Disp: 90 tablet, Rfl: 4  potassium chloride SA (K-DUR,KLOR-CON) 20 MEQ tablet, Take 1 tablet (20 mEq total) by mouth once daily., Disp: 90 tablet, Rfl: 4  rivaroxaban (XARELTO) 20 mg Tab, Take 1 tablet (20 mg total) by mouth once daily., Disp: 90 tablet, Rfl: 4  vit C,U-Oe-nrhkk-lutein-zeaxan (PRESERVISION AREDS 2) 724-710-43-1 mg-unit-mg-mg Cap, Take by mouth., Disp: , Rfl:   vitamin D (VITAMIN D3) 1000 units Tab, Take 5,000 Units by mouth once daily., Disp: , Rfl:   diclofenac sodium (VOLTAREN) 1 % Gel, Apply 2 g topically once daily. Apply to knees (Patient not taking: No sig reported), Disp: 100 g, Rfl: 0    No current facility-administered medications for this visit.      Nursing:   UTI prevention   Diet:   no change    Activities:   activity as tolerated    Labs:  Report Lab results to PCP.    Referrals/ Consults  Physical Therapy to evaluate and treat. Evaluate for home safety and equipment needs; Establish/upgrade home exercise program. Perform / instruct on therapeutic exercises, gait training, transfer training, and Range of Motion.\r\nOccupational Therapy to evaluate and treat. Evaluate home environment for safety and equipment needs. Perform/Instruct on transfers, ADL training, ROM, and therapeutic exercises.\r\nSocial Worker to evaluate for community resources/long-range planning.\r\nAide to provide assistance with personal care, ADLs, and vital signs.    Home Health Aide:  Nursing Twice weekly, Physical Therapy Three times weekly, Occupational Therapy Twice weekly, Medical Social Work Weekly, and Home Health Aide Twice weekly      Order Specific Question:   What Home Health Agency is the patient currently using?     Answer:   Other/External     Comments:   [facility]     Medications Ordered This Encounter   Medications    0.9 % sodium chloride (SODIUM CHLORIDE 0.9%) solution     Sig: Provide IV kit with each liter of normal saline.  Home health nurse to infuse 1 liter of normal saline daily as needed for dehydration.     Dispense:  1000 mL     Refill:  3    EScitalopram oxalate (LEXAPRO) 10 MG tablet     Sig: Take 1 tablet (10 mg total) by mouth once daily.     Dispense:  90 tablet     Refill:  4    ondansetron (ZOFRAN) 4 MG tablet     Sig: Take 1 tablet (4 mg total) by mouth every 8 (eight) hours as needed for Nausea.     Dispense:  30 tablet     Refill:  0            Follow up in about 3 months (around 12/9/2022), or if symptoms worsen or fail to improve, for Med refills.    If no improvement in symptoms or symptoms worsen, advised to call/follow-up at clinic or go to ER. Patient voiced understanding and all questions/concerns were addressed.   DISCLAIMER: This note was compiled by using a speech recognition dictation system and therefore please be aware that typographical / speech recognition errors can and do occur.  Please contact me if you see any errors specifically.    Adithya Valerio M.D.       Office: [phone] [address] [place] [address]  FAX: [phone]

## 2024-07-21 DIAGNOSIS — Z86.711 HX PULMONARY EMBOLISM: Chronic | ICD-10-CM

## 2024-07-21 DIAGNOSIS — Z79.899 ENCOUNTER FOR LONG-TERM (CURRENT) USE OF MEDICATIONS: ICD-10-CM

## 2024-07-21 RX ORDER — RIVAROXABAN 20 MG/1
TABLET, FILM COATED ORAL
Qty: 90 TABLET | Refills: 3 | Status: SHIPPED | OUTPATIENT
Start: 2024-07-21

## 2024-07-21 NOTE — TELEPHONE ENCOUNTER
Refill Routing Note   Medication(s) are not appropriate for processing by [facility] for the following reason(s):        Outside of protocol  Responsible provider unclear    [facility] action(s):  Route               Appointments  past 12m or future 3m with PCP    Date Provider   Last Visit   9/9/2022 Adithya Valerio MD   Next Visit   Visit date not found Adithya Valerio MD   ED visits in past 90 days: 0        Note composed:3:18 AM 07/21/2024

## 2025-07-01 DIAGNOSIS — M80.00XD AGE-RELATED OSTEOPOROSIS WITH CURRENT PATHOLOGICAL FRACTURE WITH ROUTINE HEALING: Chronic | ICD-10-CM

## 2025-07-01 DIAGNOSIS — Z79.899 ENCOUNTER FOR LONG-TERM (CURRENT) USE OF MEDICATIONS: ICD-10-CM

## 2025-07-01 RX ORDER — ALENDRONATE SODIUM 70 MG/1
TABLET ORAL
Qty: 4 TABLET | Refills: 0 | Status: SHIPPED | OUTPATIENT
Start: 2025-07-01

## 2025-07-01 NOTE — TELEPHONE ENCOUNTER
Refill Routing Note   Medication(s) are not appropriate for processing by [facility] for the following reason(s):        Outside of protocol    [facility] action(s):  Route               Appointments  past 12m or future 3m with PCP    Date Provider   Last Visit   9/9/2022 Adithya Valerio MD   Next Visit   Visit date not found Adithya Valerio MD   ED visits in past 90 days: 0        Note composed:3:00 PM 07/01/2025

## 2025-07-15 DIAGNOSIS — Z79.899 ENCOUNTER FOR LONG-TERM (CURRENT) USE OF MEDICATIONS: ICD-10-CM

## 2025-07-15 DIAGNOSIS — M80.00XD AGE-RELATED OSTEOPOROSIS WITH CURRENT PATHOLOGICAL FRACTURE WITH ROUTINE HEALING: Chronic | ICD-10-CM

## 2025-07-15 RX ORDER — ALENDRONATE SODIUM 70 MG/1
TABLET ORAL
Qty: 4 TABLET | Refills: 0 | Status: SHIPPED | OUTPATIENT
Start: 2025-07-15

## 2025-07-15 NOTE — TELEPHONE ENCOUNTER
Refill Routing Note   Medication(s) are not appropriate for processing by [facility] for the following reason(s):        Outside of protocol  Responsible provider unclear    [facility] action(s):  Route             Appointments  past 12m or future 3m with PCP    Date Provider   Last Visit   9/9/2022 Adithya Valerio MD   Next Visit   Visit date not found Adithya Valerio MD   ED visits in past 90 days: 0        Note composed:3:08 PM 07/15/2025

## 2025-08-28 DIAGNOSIS — Z79.899 ENCOUNTER FOR LONG-TERM (CURRENT) USE OF MEDICATIONS: ICD-10-CM

## 2025-08-28 DIAGNOSIS — M80.00XD AGE-RELATED OSTEOPOROSIS WITH CURRENT PATHOLOGICAL FRACTURE WITH ROUTINE HEALING: Chronic | ICD-10-CM

## 2025-08-28 RX ORDER — ALENDRONATE SODIUM 70 MG/1
TABLET ORAL
Qty: 4 TABLET | Refills: 0 | Status: SHIPPED | OUTPATIENT
Start: 2025-08-28